# Patient Record
Sex: FEMALE | Race: WHITE | NOT HISPANIC OR LATINO | Employment: OTHER | URBAN - METROPOLITAN AREA
[De-identification: names, ages, dates, MRNs, and addresses within clinical notes are randomized per-mention and may not be internally consistent; named-entity substitution may affect disease eponyms.]

---

## 2023-03-06 LAB
ALANINE AMINOTRANSFERASE (SGPT) (U/L) IN SER/PLAS: 15 U/L (ref 7–45)
ALBUMIN (G/DL) IN SER/PLAS: 4.2 G/DL (ref 3.4–5)
ALKALINE PHOSPHATASE (U/L) IN SER/PLAS: 56 U/L (ref 33–136)
ANION GAP IN SER/PLAS: 14 MMOL/L (ref 10–20)
ASPARTATE AMINOTRANSFERASE (SGOT) (U/L) IN SER/PLAS: 16 U/L (ref 9–39)
BASOPHILS (10*3/UL) IN BLOOD BY AUTOMATED COUNT: 0.06 X10E9/L (ref 0–0.1)
BASOPHILS/100 LEUKOCYTES IN BLOOD BY AUTOMATED COUNT: 1 % (ref 0–2)
BILIRUBIN DIRECT (MG/DL) IN SER/PLAS: 0.1 MG/DL (ref 0–0.3)
BILIRUBIN TOTAL (MG/DL) IN SER/PLAS: 0.6 MG/DL (ref 0–1.2)
CALCIUM (MG/DL) IN SER/PLAS: 9.4 MG/DL (ref 8.6–10.6)
CARBON DIOXIDE, TOTAL (MMOL/L) IN SER/PLAS: 29 MMOL/L (ref 21–32)
CHLORIDE (MMOL/L) IN SER/PLAS: 103 MMOL/L (ref 98–107)
CREATININE (MG/DL) IN SER/PLAS: 0.72 MG/DL (ref 0.5–1.05)
EOSINOPHILS (10*3/UL) IN BLOOD BY AUTOMATED COUNT: 0.15 X10E9/L (ref 0–0.7)
EOSINOPHILS/100 LEUKOCYTES IN BLOOD BY AUTOMATED COUNT: 2.4 % (ref 0–6)
ERYTHROCYTE DISTRIBUTION WIDTH (RATIO) BY AUTOMATED COUNT: 14.3 % (ref 11.5–14.5)
ERYTHROCYTE MEAN CORPUSCULAR HEMOGLOBIN CONCENTRATION (G/DL) BY AUTOMATED: 31.2 G/DL (ref 32–36)
ERYTHROCYTE MEAN CORPUSCULAR VOLUME (FL) BY AUTOMATED COUNT: 91 FL (ref 80–100)
ERYTHROCYTES (10*6/UL) IN BLOOD BY AUTOMATED COUNT: 4.03 X10E12/L (ref 4–5.2)
GFR FEMALE: >90 ML/MIN/1.73M2
GLUCOSE (MG/DL) IN SER/PLAS: 103 MG/DL (ref 74–99)
HEMATOCRIT (%) IN BLOOD BY AUTOMATED COUNT: 36.5 % (ref 36–46)
HEMOGLOBIN (G/DL) IN BLOOD: 11.4 G/DL (ref 12–16)
IMMATURE GRANULOCYTES/100 LEUKOCYTES IN BLOOD BY AUTOMATED COUNT: 0.3 % (ref 0–0.9)
INR IN PPP BY COAGULATION ASSAY: 1 (ref 0.9–1.1)
LEUKOCYTES (10*3/UL) IN BLOOD BY AUTOMATED COUNT: 6.2 X10E9/L (ref 4.4–11.3)
LYMPHOCYTES (10*3/UL) IN BLOOD BY AUTOMATED COUNT: 1.85 X10E9/L (ref 1.2–4.8)
LYMPHOCYTES/100 LEUKOCYTES IN BLOOD BY AUTOMATED COUNT: 29.8 % (ref 13–44)
MONOCYTES (10*3/UL) IN BLOOD BY AUTOMATED COUNT: 0.52 X10E9/L (ref 0.1–1)
MONOCYTES/100 LEUKOCYTES IN BLOOD BY AUTOMATED COUNT: 8.4 % (ref 2–10)
NEUTROPHILS (10*3/UL) IN BLOOD BY AUTOMATED COUNT: 3.61 X10E9/L (ref 1.2–7.7)
NEUTROPHILS/100 LEUKOCYTES IN BLOOD BY AUTOMATED COUNT: 58.1 % (ref 40–80)
NRBC (PER 100 WBCS) BY AUTOMATED COUNT: 0 /100 WBC (ref 0–0)
PLATELETS (10*3/UL) IN BLOOD AUTOMATED COUNT: 209 X10E9/L (ref 150–450)
POTASSIUM (MMOL/L) IN SER/PLAS: 3.9 MMOL/L (ref 3.5–5.3)
PROTEIN TOTAL: 6.5 G/DL (ref 6.4–8.2)
PROTHROMBIN TIME (PT) IN PPP BY COAGULATION ASSAY: 11.8 SEC (ref 9.8–13.4)
SODIUM (MMOL/L) IN SER/PLAS: 142 MMOL/L (ref 136–145)
UREA NITROGEN (MG/DL) IN SER/PLAS: 21 MG/DL (ref 6–23)

## 2023-03-09 DIAGNOSIS — I10 HYPERTENSION, UNSPECIFIED TYPE: ICD-10-CM

## 2023-03-09 RX ORDER — LOSARTAN POTASSIUM 50 MG/1
1 TABLET ORAL DAILY
COMMUNITY
Start: 2022-06-05 | End: 2023-03-09 | Stop reason: SDUPTHER

## 2023-03-09 RX ORDER — LOSARTAN POTASSIUM 50 MG/1
50 TABLET ORAL DAILY
Qty: 90 TABLET | Refills: 1 | Status: SHIPPED | OUTPATIENT
Start: 2023-03-09 | End: 2023-08-14 | Stop reason: SDUPTHER

## 2023-03-09 RX ORDER — FUROSEMIDE 20 MG/1
1 TABLET ORAL DAILY
COMMUNITY
Start: 2021-08-12 | End: 2023-03-09 | Stop reason: SDUPTHER

## 2023-03-09 RX ORDER — FUROSEMIDE 20 MG/1
20 TABLET ORAL DAILY
Qty: 90 TABLET | Refills: 1 | Status: SHIPPED | OUTPATIENT
Start: 2023-03-09 | End: 2023-06-06

## 2023-04-19 PROBLEM — R10.823 RIGHT LOWER QUADRANT ABDOMINAL TENDERNESS WITH REBOUND TENDERNESS: Status: ACTIVE | Noted: 2023-04-19

## 2023-04-19 PROBLEM — Z98.49 HISTORY OF YAG LASER CAPSULOTOMY OF LENS: Status: ACTIVE | Noted: 2023-04-19

## 2023-04-19 PROBLEM — M17.11 PRIMARY OSTEOARTHRITIS OF RIGHT KNEE: Status: ACTIVE | Noted: 2023-04-19

## 2023-04-19 PROBLEM — R63.0 DECREASED APPETITE: Status: ACTIVE | Noted: 2023-04-19

## 2023-04-19 PROBLEM — H52.00 HYPEROPIA: Status: ACTIVE | Noted: 2023-04-19

## 2023-04-19 PROBLEM — R25.1 TREMOR: Status: ACTIVE | Noted: 2023-04-19

## 2023-04-19 PROBLEM — K74.60 CIRRHOSIS, NONALCOHOLIC (MULTI): Status: ACTIVE | Noted: 2023-04-19

## 2023-04-19 PROBLEM — N18.30 STAGE 3 CHRONIC KIDNEY DISEASE (MULTI): Status: ACTIVE | Noted: 2023-04-19

## 2023-04-19 PROBLEM — K35.32 ACUTE PERFORATED APPENDICITIS: Status: RESOLVED | Noted: 2023-04-19 | Resolved: 2023-04-19

## 2023-04-19 PROBLEM — R35.0 URINARY FREQUENCY: Status: RESOLVED | Noted: 2023-04-19 | Resolved: 2023-04-19

## 2023-04-19 PROBLEM — E11.21 DIABETES MELLITUS WITH KIDNEY DISEASE (MULTI): Status: RESOLVED | Noted: 2023-04-19 | Resolved: 2023-04-19

## 2023-04-19 PROBLEM — E11.3293 MILD NONPROLIFERATIVE DIABETIC RETINOPATHY OF BOTH EYES WITHOUT MACULAR EDEMA (MULTI): Status: ACTIVE | Noted: 2023-04-19

## 2023-04-19 PROBLEM — Z96.1 PSEUDOPHAKIA OF BOTH EYES: Status: ACTIVE | Noted: 2023-04-19

## 2023-04-19 PROBLEM — D12.6 TUBULAR ADENOMA OF COLON: Status: ACTIVE | Noted: 2023-04-19

## 2023-04-19 PROBLEM — M25.561 RIGHT KNEE PAIN: Status: RESOLVED | Noted: 2023-04-19 | Resolved: 2023-04-19

## 2023-04-19 PROBLEM — R92.8 ABNORMAL MAMMOGRAM: Status: ACTIVE | Noted: 2023-04-19

## 2023-04-19 PROBLEM — H52.209 ASTIGMATISM: Status: ACTIVE | Noted: 2023-04-19

## 2023-04-19 PROBLEM — E11.9 DIABETES MELLITUS, TYPE 2 (MULTI): Status: ACTIVE | Noted: 2023-04-19

## 2023-04-19 PROBLEM — E78.5 HYPERLIPIDEMIA: Status: ACTIVE | Noted: 2023-04-19

## 2023-04-19 PROBLEM — H52.4 MYOPIA WITH PRESBYOPIA: Status: ACTIVE | Noted: 2023-04-19

## 2023-04-19 PROBLEM — I10 HYPERTENSION: Status: ACTIVE | Noted: 2023-04-19

## 2023-04-19 PROBLEM — N30.01 ACUTE CYSTITIS WITH HEMATURIA: Status: RESOLVED | Noted: 2023-04-19 | Resolved: 2023-04-19

## 2023-04-19 PROBLEM — U07.1 COVID-19 VIRUS INFECTION: Status: RESOLVED | Noted: 2023-04-19 | Resolved: 2023-04-19

## 2023-04-19 PROBLEM — R11.0 NAUSEA IN ADULT: Status: ACTIVE | Noted: 2023-04-19

## 2023-04-19 PROBLEM — E03.9 HYPOTHYROIDISM: Status: ACTIVE | Noted: 2023-04-19

## 2023-04-19 PROBLEM — H52.10 MYOPIA WITH PRESBYOPIA: Status: ACTIVE | Noted: 2023-04-19

## 2023-04-19 PROBLEM — M25.661 STIFFNESS OF RIGHT KNEE, NOT ELSEWHERE CLASSIFIED: Status: ACTIVE | Noted: 2023-04-19

## 2023-04-19 RX ORDER — INSULIN ASPART 100 [IU]/ML
INJECTION, SOLUTION INTRAVENOUS; SUBCUTANEOUS
COMMUNITY
Start: 2021-09-03 | End: 2024-02-02 | Stop reason: SDUPTHER

## 2023-04-19 RX ORDER — FLASH GLUCOSE SENSOR
KIT MISCELLANEOUS AS NEEDED
COMMUNITY
Start: 2023-02-24

## 2023-04-19 RX ORDER — SIMVASTATIN 10 MG/1
1 TABLET, FILM COATED ORAL DAILY
COMMUNITY
Start: 2021-07-27 | End: 2023-08-14 | Stop reason: SDUPTHER

## 2023-04-19 RX ORDER — BISOPROLOL FUMARATE AND HYDROCHLOROTHIAZIDE 10; 6.25 MG/1; MG/1
1 TABLET ORAL DAILY
COMMUNITY
End: 2023-06-29

## 2023-04-19 RX ORDER — METFORMIN HYDROCHLORIDE 1000 MG/1
1 TABLET ORAL 2 TIMES DAILY
COMMUNITY
Start: 2021-07-15 | End: 2023-08-14 | Stop reason: SDUPTHER

## 2023-04-19 RX ORDER — NAPROXEN SODIUM 220 MG/1
81 TABLET, FILM COATED ORAL DAILY
COMMUNITY
Start: 2022-10-27 | End: 2023-08-14

## 2023-04-19 RX ORDER — INSULIN GLARGINE 100 [IU]/ML
37 INJECTION, SOLUTION SUBCUTANEOUS DAILY
COMMUNITY
Start: 2022-01-06

## 2023-04-19 RX ORDER — PROPRANOLOL HYDROCHLORIDE 10 MG/1
20 TABLET ORAL 2 TIMES DAILY
COMMUNITY
End: 2023-08-14 | Stop reason: SDUPTHER

## 2023-04-19 RX ORDER — LEVOTHYROXINE SODIUM 112 UG/1
1 TABLET ORAL DAILY
COMMUNITY
Start: 2021-05-31 | End: 2023-08-14 | Stop reason: SDUPTHER

## 2023-04-19 RX ORDER — DULAGLUTIDE 1.5 MG/.5ML
1.5 INJECTION, SOLUTION SUBCUTANEOUS
COMMUNITY
Start: 2021-09-10 | End: 2023-11-22 | Stop reason: SDUPTHER

## 2023-05-08 ENCOUNTER — OFFICE VISIT (OUTPATIENT)
Dept: PRIMARY CARE | Facility: CLINIC | Age: 68
End: 2023-05-08
Payer: MEDICARE

## 2023-05-08 VITALS
BODY MASS INDEX: 31.41 KG/M2 | HEIGHT: 64 IN | DIASTOLIC BLOOD PRESSURE: 76 MMHG | HEART RATE: 81 BPM | WEIGHT: 184 LBS | SYSTOLIC BLOOD PRESSURE: 126 MMHG

## 2023-05-08 DIAGNOSIS — E06.3 HYPOTHYROIDISM DUE TO HASHIMOTO'S THYROIDITIS: ICD-10-CM

## 2023-05-08 DIAGNOSIS — K74.60 CIRRHOSIS, NONALCOHOLIC (MULTI): ICD-10-CM

## 2023-05-08 DIAGNOSIS — E11.3293 MILD NONPROLIFERATIVE DIABETIC RETINOPATHY OF BOTH EYES WITHOUT MACULAR EDEMA ASSOCIATED WITH TYPE 2 DIABETES MELLITUS (MULTI): ICD-10-CM

## 2023-05-08 DIAGNOSIS — E03.8 HYPOTHYROIDISM DUE TO HASHIMOTO'S THYROIDITIS: ICD-10-CM

## 2023-05-08 DIAGNOSIS — J01.00 ACUTE NON-RECURRENT MAXILLARY SINUSITIS: Primary | ICD-10-CM

## 2023-05-08 DIAGNOSIS — N18.31 TYPE 2 DIABETES MELLITUS WITH STAGE 3A CHRONIC KIDNEY DISEASE, WITH LONG-TERM CURRENT USE OF INSULIN (MULTI): ICD-10-CM

## 2023-05-08 DIAGNOSIS — I10 PRIMARY HYPERTENSION: ICD-10-CM

## 2023-05-08 DIAGNOSIS — E11.22 TYPE 2 DIABETES MELLITUS WITH STAGE 3A CHRONIC KIDNEY DISEASE, WITH LONG-TERM CURRENT USE OF INSULIN (MULTI): ICD-10-CM

## 2023-05-08 DIAGNOSIS — N18.31 STAGE 3A CHRONIC KIDNEY DISEASE (MULTI): ICD-10-CM

## 2023-05-08 DIAGNOSIS — Z79.4 TYPE 2 DIABETES MELLITUS WITH STAGE 3A CHRONIC KIDNEY DISEASE, WITH LONG-TERM CURRENT USE OF INSULIN (MULTI): ICD-10-CM

## 2023-05-08 PROCEDURE — 4010F ACE/ARB THERAPY RXD/TAKEN: CPT | Performed by: FAMILY MEDICINE

## 2023-05-08 PROCEDURE — 1036F TOBACCO NON-USER: CPT | Performed by: FAMILY MEDICINE

## 2023-05-08 PROCEDURE — 3044F HG A1C LEVEL LT 7.0%: CPT | Performed by: FAMILY MEDICINE

## 2023-05-08 PROCEDURE — 1159F MED LIST DOCD IN RCRD: CPT | Performed by: FAMILY MEDICINE

## 2023-05-08 PROCEDURE — 3078F DIAST BP <80 MM HG: CPT | Performed by: FAMILY MEDICINE

## 2023-05-08 PROCEDURE — 3074F SYST BP LT 130 MM HG: CPT | Performed by: FAMILY MEDICINE

## 2023-05-08 PROCEDURE — 99214 OFFICE O/P EST MOD 30 MIN: CPT | Performed by: FAMILY MEDICINE

## 2023-05-08 RX ORDER — CEFUROXIME AXETIL 500 MG/1
500 TABLET ORAL 2 TIMES DAILY
Qty: 20 TABLET | Refills: 0 | Status: SHIPPED | OUTPATIENT
Start: 2023-05-08 | End: 2023-05-18

## 2023-05-08 ASSESSMENT — PATIENT HEALTH QUESTIONNAIRE - PHQ9
2. FEELING DOWN, DEPRESSED OR HOPELESS: NOT AT ALL
1. LITTLE INTEREST OR PLEASURE IN DOING THINGS: NOT AT ALL
SUM OF ALL RESPONSES TO PHQ9 QUESTIONS 1 AND 2: 0

## 2023-05-14 PROBLEM — J01.00 ACUTE NON-RECURRENT MAXILLARY SINUSITIS: Status: ACTIVE | Noted: 2023-05-14

## 2023-05-14 NOTE — PROGRESS NOTES
"Assessment and Plan:  Problem List Items Addressed This Visit       Cirrhosis, nonalcoholic (CMS/HCC)    Current Assessment & Plan     Control risk factors         Diabetes mellitus, type 2 (CMS/HCC)    Current Assessment & Plan     Continue follow-up with endocrinology         Hypertension    Current Assessment & Plan     Patient's blood pressure is at goal of 130/85 or less. Condition is stable. Continue current medications and treatment plan.  I recommend that you exercise for 30-45 minutes 5 days a week.  I also recommend a balanced diet with fruits and vegetables every day, lean meats, and little fried foods. The DASH diet (you can find this online) is a good example of this.          Hypothyroidism    Mild nonproliferative diabetic retinopathy of both eyes without macular edema (CMS/HCC)    Current Assessment & Plan     Continue follow-up with ophthalmology         Stage 3 chronic kidney disease (CMS/HCC)    Acute non-recurrent maxillary sinusitis - Primary    Relevant Medications    cefuroxime (Ceftin) 500 mg tablet     Follow-up 3 months    HPI: Patient is here for follow-up of diabetes hypertension hyperlipidemia diabetes traveling has been stable coughing congested        ROS   Constitutional:  o weight loss. Negative for chills and fever.     Cardiovascular: Negative.    Gastrointestinal: Negative.  Negative for nausea and vomiting.   Endocrine: Negative.    Genitourinary: Negative.    Musculoskeletal: Negative.    Skin: Negative.  Negative for rash.   Allergic/Immunologic: Negative.    Neurological: Negative.    Hematological: Negative.    Psychiatric/Behavioral: Negative.     All other systems reviewed and are negative.      Blood Pressure 126/76   Pulse 81   Height 1.626 m (5' 4\")   Weight 83.5 kg (184 lb)   Body Mass Index 31.58 kg/m²   Body mass index is 31.58 kg/m².  Physical Exam    ENT:      Head: Normocephalic and atraumatic.      Right Ear: Tympanic membrane normal.      Left Ear: Tympanic " membrane normal.      Nose: Nose normal.      Mouth/Throat:      Mouth: Mucous membranes are moist.   Eyes:      Pupils: Pupils are equal, round, and reactive to light.   Cardiovascular:      Rate and Rhythm: Normal rate and regular rhythm.      Pulses: Normal pulses.      Heart sounds: Normal heart sounds.   Pulmonary:      Effort: Pulmonary effort is normal.      Breath sounds: Normal breath sounds.   Abdominal:      General: Abdomen is flat. Bowel sounds are normal.      Palpations: Abdomen is soft.   Musculoskeletal:         General: Normal range of motion.      Cervical back: Normal range of motion and neck supple.   Skin:     General: Skin is warm and dry.      Capillary Refill: Capillary refill takes less than 2 seconds.   Neurological:      General: No focal deficit present.      Mental Status: She is alert and oriented to person, place, and time.   Psychiatric:         Mood and Affect: Mood normal.       Active Problem List  Patient Active Problem List   Diagnosis    Abnormal mammogram    Astigmatism    Hyperopia    Myopia with presbyopia    Cirrhosis, nonalcoholic (CMS/HCC)    Decreased appetite    Diabetes mellitus, type 2 (CMS/HCC)    History of YAG laser capsulotomy of lens    Hyperlipidemia    Hypertension    Hypothyroidism    Mild nonproliferative diabetic retinopathy of both eyes without macular edema (CMS/HCC)    Primary osteoarthritis of right knee    Nausea in adult    Stage 3 chronic kidney disease (CMS/HCC)    Pseudophakia of both eyes    Right lower quadrant abdominal tenderness with rebound tenderness    Tremor    Tubular adenoma of colon    Stiffness of right knee, not elsewhere classified    Acute non-recurrent maxillary sinusitis       Comprehensive Medical/Surgical/Social/Family History  Past Medical History:   Diagnosis Date    Acquired absence of both cervix and uterus     H/O: hysterectomy    Acute cystitis with hematuria 04/19/2023    COVID-19 virus infection 04/19/2023    Diabetes  mellitus with kidney disease (CMS/HCC) 04/19/2023    Personal history of other diseases of the digestive system     History of appendicitis    Personal history of other endocrine, nutritional and metabolic disease     History of diabetic retinopathy    Postprocedural hypothyroidism     H/O thyroidectomy    Right lower quadrant pain 07/08/2021    Acute right lower quadrant pain    Type 2 diabetes mellitus without complications (CMS/[facility])     Diabetes mellitus, stable     Past Surgical History:   Procedure Laterality Date    OTHER SURGICAL HISTORY  07/16/2021    Hysterectomy    OTHER SURGICAL HISTORY  07/16/2021    Appendectomy    OTHER SURGICAL HISTORY  07/16/2021    Thyroidectomy    OTHER SURGICAL HISTORY  04/25/2022    YAG laser capsulotomy    OTHER SURGICAL HISTORY  04/25/2022    Cataract surgery    OTHER SURGICAL HISTORY  01/06/2022    Knee replacement    OTHER SURGICAL HISTORY  01/06/2022    Tonsillectomy     Social History     Social History Narrative    Not on file       Allergies and Medications  Penicillins and Sulfamethoxazole  Current Outpatient Medications   Medication Instructions    aspirin 81 mg, oral, Daily    bisoproloL-hydrochlorothiazide (Ziac) 10-6.25 mg tablet 1 tablet, oral, Daily    cefuroxime (CEFTIN) 500 mg, oral, 2 times daily    FreeStyle Lucero 2 Sensor kit subcutaneous, As needed    furosemide (LASIX) 20 mg, oral, Daily    insulin aspart (NovoLOG) 100 unit/mL (3 mL) pen subcutaneous    Lantus Solostar U-100 Insulin 100 unit/mL (3 mL) pen INJECT 66 UNIT Daily    levothyroxine (Synthroid, Levoxyl) 112 mcg tablet 1 tablet, oral, Daily    losartan (COZAAR) 50 mg, oral, Daily    metFORMIN (Glucophage) 1,000 mg tablet 1 tablet, oral, 2 times daily    propranolol (INDERAL) 20 mg, oral, 2 times daily    simvastatin (Zocor) 10 mg tablet 1 tablet, oral, Daily    Trulicity 1.5 mg, subcutaneous, Weekly

## 2023-05-14 NOTE — ASSESSMENT & PLAN NOTE
Patient's blood pressure is at goal of 130/85 or less. Condition is stable. Continue current medications and treatment plan.  I recommend that you exercise for 30-45 minutes 5 days a week.  I also recommend a balanced diet with fruits and vegetables every day, lean meats, and little fried foods. The DASH diet (you can find this online) is a good example of this.

## 2023-06-01 LAB
ALANINE AMINOTRANSFERASE (SGPT) (U/L) IN SER/PLAS: 16 U/L (ref 7–45)
ALBUMIN (G/DL) IN SER/PLAS: 4.3 G/DL (ref 3.4–5)
ALKALINE PHOSPHATASE (U/L) IN SER/PLAS: 51 U/L (ref 33–136)
ALPHA-1 FETOPROTEIN (NG/ML) IN SER/PLAS: <4 NG/ML (ref 0–9)
ANION GAP IN SER/PLAS: 14 MMOL/L (ref 10–20)
ASPARTATE AMINOTRANSFERASE (SGOT) (U/L) IN SER/PLAS: 18 U/L (ref 9–39)
BASOPHILS (10*3/UL) IN BLOOD BY AUTOMATED COUNT: 0.07 X10E9/L (ref 0–0.1)
BASOPHILS/100 LEUKOCYTES IN BLOOD BY AUTOMATED COUNT: 0.9 % (ref 0–2)
BILIRUBIN DIRECT (MG/DL) IN SER/PLAS: 0.1 MG/DL (ref 0–0.3)
BILIRUBIN TOTAL (MG/DL) IN SER/PLAS: 0.6 MG/DL (ref 0–1.2)
CALCIUM (MG/DL) IN SER/PLAS: 10 MG/DL (ref 8.6–10.6)
CARBON DIOXIDE, TOTAL (MMOL/L) IN SER/PLAS: 30 MMOL/L (ref 21–32)
CHLORIDE (MMOL/L) IN SER/PLAS: 101 MMOL/L (ref 98–107)
CREATININE (MG/DL) IN SER/PLAS: 0.89 MG/DL (ref 0.5–1.05)
EOSINOPHILS (10*3/UL) IN BLOOD BY AUTOMATED COUNT: 0.24 X10E9/L (ref 0–0.7)
EOSINOPHILS/100 LEUKOCYTES IN BLOOD BY AUTOMATED COUNT: 3.3 % (ref 0–6)
ERYTHROCYTE DISTRIBUTION WIDTH (RATIO) BY AUTOMATED COUNT: 14 % (ref 11.5–14.5)
ERYTHROCYTE MEAN CORPUSCULAR HEMOGLOBIN CONCENTRATION (G/DL) BY AUTOMATED: 32.4 G/DL (ref 32–36)
ERYTHROCYTE MEAN CORPUSCULAR VOLUME (FL) BY AUTOMATED COUNT: 91 FL (ref 80–100)
ERYTHROCYTES (10*6/UL) IN BLOOD BY AUTOMATED COUNT: 3.96 X10E12/L (ref 4–5.2)
ESTIMATED AVERAGE GLUCOSE FOR HBA1C: 140 MG/DL
GFR FEMALE: 71 ML/MIN/1.73M2
GLUCOSE (MG/DL) IN SER/PLAS: 109 MG/DL (ref 74–99)
HEMATOCRIT (%) IN BLOOD BY AUTOMATED COUNT: 36.1 % (ref 36–46)
HEMOGLOBIN (G/DL) IN BLOOD: 11.7 G/DL (ref 12–16)
HEMOGLOBIN A1C/HEMOGLOBIN TOTAL IN BLOOD: 6.5 %
IMMATURE GRANULOCYTES/100 LEUKOCYTES IN BLOOD BY AUTOMATED COUNT: 0.3 % (ref 0–0.9)
INR IN PPP BY COAGULATION ASSAY: 1 (ref 0.9–1.1)
LEUKOCYTES (10*3/UL) IN BLOOD BY AUTOMATED COUNT: 7.4 X10E9/L (ref 4.4–11.3)
LYMPHOCYTES (10*3/UL) IN BLOOD BY AUTOMATED COUNT: 1.71 X10E9/L (ref 1.2–4.8)
LYMPHOCYTES/100 LEUKOCYTES IN BLOOD BY AUTOMATED COUNT: 23.2 % (ref 13–44)
MONOCYTES (10*3/UL) IN BLOOD BY AUTOMATED COUNT: 0.54 X10E9/L (ref 0.1–1)
MONOCYTES/100 LEUKOCYTES IN BLOOD BY AUTOMATED COUNT: 7.3 % (ref 2–10)
NEUTROPHILS (10*3/UL) IN BLOOD BY AUTOMATED COUNT: 4.8 X10E9/L (ref 1.2–7.7)
NEUTROPHILS/100 LEUKOCYTES IN BLOOD BY AUTOMATED COUNT: 65 % (ref 40–80)
NRBC (PER 100 WBCS) BY AUTOMATED COUNT: 0 /100 WBC (ref 0–0)
PLATELETS (10*3/UL) IN BLOOD AUTOMATED COUNT: 208 X10E9/L (ref 150–450)
POTASSIUM (MMOL/L) IN SER/PLAS: 4.2 MMOL/L (ref 3.5–5.3)
PROTEIN TOTAL: 6.9 G/DL (ref 6.4–8.2)
PROTHROMBIN TIME (PT) IN PPP BY COAGULATION ASSAY: 11.4 SEC (ref 9.8–13.4)
SODIUM (MMOL/L) IN SER/PLAS: 141 MMOL/L (ref 136–145)
UREA NITROGEN (MG/DL) IN SER/PLAS: 21 MG/DL (ref 6–23)

## 2023-06-06 DIAGNOSIS — I10 HYPERTENSION, UNSPECIFIED TYPE: ICD-10-CM

## 2023-06-06 RX ORDER — FUROSEMIDE 20 MG/1
20 TABLET ORAL DAILY
Qty: 30 TABLET | Refills: 0 | Status: SHIPPED | OUTPATIENT
Start: 2023-06-06 | End: 2023-07-20 | Stop reason: SDUPTHER

## 2023-06-28 DIAGNOSIS — E11.9 TYPE 2 DIABETES MELLITUS WITHOUT COMPLICATIONS (MULTI): ICD-10-CM

## 2023-06-29 RX ORDER — BISOPROLOL FUMARATE AND HYDROCHLOROTHIAZIDE 10; 6.25 MG/1; MG/1
1 TABLET ORAL DAILY
Qty: 90 TABLET | Refills: 1 | Status: SHIPPED | OUTPATIENT
Start: 2023-06-29 | End: 2023-08-14 | Stop reason: SDUPTHER

## 2023-07-20 DIAGNOSIS — I10 HYPERTENSION, UNSPECIFIED TYPE: ICD-10-CM

## 2023-07-20 RX ORDER — FUROSEMIDE 20 MG/1
20 TABLET ORAL DAILY
Qty: 90 TABLET | Refills: 1 | Status: SHIPPED | OUTPATIENT
Start: 2023-07-20 | End: 2024-01-01 | Stop reason: SDUPTHER

## 2023-08-13 PROBLEM — R92.1 CALCIFICATION OF LEFT BREAST: Status: ACTIVE | Noted: 2023-08-13

## 2023-08-14 ENCOUNTER — OFFICE VISIT (OUTPATIENT)
Dept: PRIMARY CARE | Facility: CLINIC | Age: 68
End: 2023-08-14
Payer: MEDICARE

## 2023-08-14 VITALS
HEART RATE: 76 BPM | WEIGHT: 182 LBS | BODY MASS INDEX: 31.07 KG/M2 | SYSTOLIC BLOOD PRESSURE: 123 MMHG | DIASTOLIC BLOOD PRESSURE: 80 MMHG | HEIGHT: 64 IN

## 2023-08-14 DIAGNOSIS — I10 HYPERTENSION, UNSPECIFIED TYPE: ICD-10-CM

## 2023-08-14 DIAGNOSIS — Z12.31 VISIT FOR SCREENING MAMMOGRAM: ICD-10-CM

## 2023-08-14 DIAGNOSIS — E03.8 HYPOTHYROIDISM DUE TO HASHIMOTO'S THYROIDITIS: ICD-10-CM

## 2023-08-14 DIAGNOSIS — E78.5 HYPERLIPIDEMIA, UNSPECIFIED HYPERLIPIDEMIA TYPE: ICD-10-CM

## 2023-08-14 DIAGNOSIS — E11.9 TYPE 2 DIABETES MELLITUS WITHOUT COMPLICATIONS (MULTI): ICD-10-CM

## 2023-08-14 DIAGNOSIS — E06.3 HYPOTHYROIDISM DUE TO HASHIMOTO'S THYROIDITIS: ICD-10-CM

## 2023-08-14 DIAGNOSIS — Z00.00 ROUTINE GENERAL MEDICAL EXAMINATION AT HEALTH CARE FACILITY: Primary | ICD-10-CM

## 2023-08-14 PROCEDURE — G0439 PPPS, SUBSEQ VISIT: HCPCS | Performed by: FAMILY MEDICINE

## 2023-08-14 PROCEDURE — 3074F SYST BP LT 130 MM HG: CPT | Performed by: FAMILY MEDICINE

## 2023-08-14 PROCEDURE — 1036F TOBACCO NON-USER: CPT | Performed by: FAMILY MEDICINE

## 2023-08-14 PROCEDURE — 1159F MED LIST DOCD IN RCRD: CPT | Performed by: FAMILY MEDICINE

## 2023-08-14 PROCEDURE — 99214 OFFICE O/P EST MOD 30 MIN: CPT | Performed by: FAMILY MEDICINE

## 2023-08-14 PROCEDURE — 3079F DIAST BP 80-89 MM HG: CPT | Performed by: FAMILY MEDICINE

## 2023-08-14 PROCEDURE — 3044F HG A1C LEVEL LT 7.0%: CPT | Performed by: FAMILY MEDICINE

## 2023-08-14 PROCEDURE — 4010F ACE/ARB THERAPY RXD/TAKEN: CPT | Performed by: FAMILY MEDICINE

## 2023-08-14 PROCEDURE — 1160F RVW MEDS BY RX/DR IN RCRD: CPT | Performed by: FAMILY MEDICINE

## 2023-08-14 PROCEDURE — 1170F FXNL STATUS ASSESSED: CPT | Performed by: FAMILY MEDICINE

## 2023-08-14 PROCEDURE — 1126F AMNT PAIN NOTED NONE PRSNT: CPT | Performed by: FAMILY MEDICINE

## 2023-08-14 RX ORDER — PROPRANOLOL HYDROCHLORIDE 10 MG/1
20 TABLET ORAL 2 TIMES DAILY
Qty: 180 TABLET | Refills: 1 | Status: SHIPPED | OUTPATIENT
Start: 2023-08-14 | End: 2024-02-19 | Stop reason: SDUPTHER

## 2023-08-14 RX ORDER — LEVOTHYROXINE SODIUM 112 UG/1
112 TABLET ORAL DAILY
Qty: 90 TABLET | Refills: 1 | Status: SHIPPED | OUTPATIENT
Start: 2023-08-14 | End: 2024-05-20 | Stop reason: SDUPTHER

## 2023-08-14 RX ORDER — LOSARTAN POTASSIUM 50 MG/1
50 TABLET ORAL DAILY
Qty: 90 TABLET | Refills: 1 | Status: SHIPPED | OUTPATIENT
Start: 2023-08-14 | End: 2024-02-19 | Stop reason: SDUPTHER

## 2023-08-14 RX ORDER — BISOPROLOL FUMARATE AND HYDROCHLOROTHIAZIDE 10; 6.25 MG/1; MG/1
1 TABLET ORAL DAILY
Qty: 90 TABLET | Refills: 1 | Status: SHIPPED | OUTPATIENT
Start: 2023-08-14 | End: 2024-02-19 | Stop reason: SDUPTHER

## 2023-08-14 RX ORDER — METFORMIN HYDROCHLORIDE 1000 MG/1
1000 TABLET ORAL 2 TIMES DAILY
Qty: 180 TABLET | Refills: 1 | Status: SHIPPED | OUTPATIENT
Start: 2023-08-14 | End: 2024-02-02 | Stop reason: SDUPTHER

## 2023-08-14 RX ORDER — SIMVASTATIN 10 MG/1
10 TABLET, FILM COATED ORAL DAILY
Qty: 90 TABLET | Refills: 1 | Status: SHIPPED | OUTPATIENT
Start: 2023-08-14 | End: 2024-02-19 | Stop reason: SDUPTHER

## 2023-08-14 ASSESSMENT — ACTIVITIES OF DAILY LIVING (ADL)
BATHING: INDEPENDENT
DOING_HOUSEWORK: INDEPENDENT
MANAGING_FINANCES: INDEPENDENT
DRESSING: INDEPENDENT
GROCERY_SHOPPING: INDEPENDENT
TAKING_MEDICATION: INDEPENDENT

## 2023-08-14 ASSESSMENT — PATIENT HEALTH QUESTIONNAIRE - PHQ9
SUM OF ALL RESPONSES TO PHQ9 QUESTIONS 1 AND 2: 0
2. FEELING DOWN, DEPRESSED OR HOPELESS: NOT AT ALL
1. LITTLE INTEREST OR PLEASURE IN DOING THINGS: NOT AT ALL

## 2023-08-14 ASSESSMENT — ENCOUNTER SYMPTOMS
OCCASIONAL FEELINGS OF UNSTEADINESS: 0
LOSS OF SENSATION IN FEET: 0
DEPRESSION: 0

## 2023-08-19 PROBLEM — Z00.00 ROUTINE GENERAL MEDICAL EXAMINATION AT HEALTH CARE FACILITY: Status: ACTIVE | Noted: 2023-08-19

## 2023-08-19 PROBLEM — E11.9 TYPE 2 DIABETES MELLITUS WITHOUT COMPLICATIONS (MULTI): Status: ACTIVE | Noted: 2023-08-19

## 2023-08-19 PROBLEM — Z12.31 VISIT FOR SCREENING MAMMOGRAM: Status: ACTIVE | Noted: 2023-08-19

## 2023-08-19 ASSESSMENT — ENCOUNTER SYMPTOMS
NAUSEA: 0
ENDOCRINE NEGATIVE: 1
MUSCULOSKELETAL NEGATIVE: 1
ALLERGIC/IMMUNOLOGIC NEGATIVE: 1
PSYCHIATRIC NEGATIVE: 1
HEMATOLOGIC/LYMPHATIC NEGATIVE: 1
CHILLS: 0
GASTROINTESTINAL NEGATIVE: 1
VOMITING: 0
NEUROLOGICAL NEGATIVE: 1
RESPIRATORY NEGATIVE: 1
FEVER: 0
CARDIOVASCULAR NEGATIVE: 1

## 2023-08-19 NOTE — PROGRESS NOTES
"Subjective   Reason for Visit: Barbara A Haase is an 68 y.o. female here for a Medicare Wellness visit.     Past Medical, Surgical, and Family History reviewed and updated in chart.    Reviewed all medications by prescribing practitioner or clinical pharmacist (such as prescriptions, OTCs, herbal therapies and supplements) and documented in the medical record.    HPI  Patient is here for follow-up and for Medicare wellness blood pressure controlled  Seeing endocrinology A1c slightly higher  Tolerating statin  Patient Care Team:  Jese Waite MD as PCP - General     Review of Systems   Constitutional:  Negative for chills and fever.   HENT: Negative.     Respiratory: Negative.     Cardiovascular: Negative.    Gastrointestinal: Negative.  Negative for nausea and vomiting.   Endocrine: Negative.    Genitourinary: Negative.    Musculoskeletal: Negative.    Skin: Negative.  Negative for rash.   Allergic/Immunologic: Negative.    Neurological: Negative.    Hematological: Negative.    Psychiatric/Behavioral: Negative.     All other systems reviewed and are negative.      Objective   Vitals:  Blood Pressure 123/80   Pulse 76   Height 1.626 m (5' 4\")   Weight 82.6 kg (182 lb)   Body Mass Index 31.24 kg/m²       Physical Exam  Vitals and nursing note reviewed.   Constitutional:       Appearance: Normal appearance.   HENT:      Head: Normocephalic and atraumatic.      Right Ear: Tympanic membrane normal.      Left Ear: Tympanic membrane normal.      Nose: Nose normal.      Mouth/Throat:      Mouth: Mucous membranes are moist.   Eyes:      Pupils: Pupils are equal, round, and reactive to light.   Cardiovascular:      Rate and Rhythm: Normal rate and regular rhythm.      Pulses: Normal pulses.      Heart sounds: Normal heart sounds.   Pulmonary:      Effort: Pulmonary effort is normal.      Breath sounds: Normal breath sounds.   Abdominal:      General: Abdomen is flat. Bowel sounds are normal.      Palpations: Abdomen is " soft.   Musculoskeletal:         General: Normal range of motion.      Cervical back: Normal range of motion and neck supple.   Skin:     General: Skin is warm and dry.      Capillary Refill: Capillary refill takes less than 2 seconds.   Neurological:      General: No focal deficit present.      Mental Status: She is alert and oriented to person, place, and time.   Psychiatric:         Mood and Affect: Mood normal.         Assessment/Plan   Problem List Items Addressed This Visit       Hyperlipidemia    Relevant Medications    simvastatin (Zocor) 10 mg tablet    Hypertension    Relevant Medications    bisoproloL-hydrochlorothiazide (Ziac) 10-6.25 mg tablet    losartan (Cozaar) 50 mg tablet    propranolol (Inderal) 10 mg tablet    Hypothyroidism    Relevant Medications    levothyroxine (Synthroid, Levoxyl) 112 mcg tablet    Routine general medical examination at health care facility - Primary    Type 2 diabetes mellitus without complications (CMS/HCC)    Relevant Medications    metFORMIN (Glucophage) 1,000 mg tablet    Visit for screening mammogram    Relevant Orders    BI mammo bilateral screening tomosynthesis   Follow up in 6 months

## 2023-09-22 LAB
ALANINE AMINOTRANSFERASE (SGPT) (U/L) IN SER/PLAS: 23 U/L (ref 7–45)
ALBUMIN (G/DL) IN SER/PLAS: 4.5 G/DL (ref 3.4–5)
ALKALINE PHOSPHATASE (U/L) IN SER/PLAS: 53 U/L (ref 33–136)
ANION GAP IN SER/PLAS: 15 MMOL/L (ref 10–20)
ASPARTATE AMINOTRANSFERASE (SGOT) (U/L) IN SER/PLAS: 23 U/L (ref 9–39)
BILIRUBIN TOTAL (MG/DL) IN SER/PLAS: 0.6 MG/DL (ref 0–1.2)
CALCIUM (MG/DL) IN SER/PLAS: 10.5 MG/DL (ref 8.6–10.6)
CARBON DIOXIDE, TOTAL (MMOL/L) IN SER/PLAS: 32 MMOL/L (ref 21–32)
CHLORIDE (MMOL/L) IN SER/PLAS: 98 MMOL/L (ref 98–107)
CREATININE (MG/DL) IN SER/PLAS: 1.11 MG/DL (ref 0.5–1.05)
ESTIMATED AVERAGE GLUCOSE FOR HBA1C: 120 MG/DL
GFR FEMALE: 54 ML/MIN/1.73M2
GLUCOSE (MG/DL) IN SER/PLAS: 110 MG/DL (ref 74–99)
HEMOGLOBIN A1C/HEMOGLOBIN TOTAL IN BLOOD: 5.8 %
POTASSIUM (MMOL/L) IN SER/PLAS: 4.1 MMOL/L (ref 3.5–5.3)
PROTEIN TOTAL: 7.1 G/DL (ref 6.4–8.2)
SODIUM (MMOL/L) IN SER/PLAS: 141 MMOL/L (ref 136–145)
UREA NITROGEN (MG/DL) IN SER/PLAS: 28 MG/DL (ref 6–23)

## 2023-10-17 ENCOUNTER — APPOINTMENT (OUTPATIENT)
Dept: RADIOLOGY | Facility: CLINIC | Age: 68
End: 2023-10-17
Payer: MEDICARE

## 2023-10-17 ENCOUNTER — OFFICE VISIT (OUTPATIENT)
Dept: ORTHOPEDIC SURGERY | Facility: CLINIC | Age: 68
End: 2023-10-17
Payer: MEDICARE

## 2023-10-17 ENCOUNTER — ANCILLARY PROCEDURE (OUTPATIENT)
Dept: RADIOLOGY | Facility: CLINIC | Age: 68
End: 2023-10-17
Payer: MEDICARE

## 2023-10-17 DIAGNOSIS — R22.32 MASS OF LEFT HAND: ICD-10-CM

## 2023-10-17 DIAGNOSIS — M67.449 MUCOUS CYST OF DIGIT OF HAND: Primary | ICD-10-CM

## 2023-10-17 PROCEDURE — 4010F ACE/ARB THERAPY RXD/TAKEN: CPT | Performed by: ORTHOPAEDIC SURGERY

## 2023-10-17 PROCEDURE — 99213 OFFICE O/P EST LOW 20 MIN: CPT | Performed by: ORTHOPAEDIC SURGERY

## 2023-10-17 PROCEDURE — 3044F HG A1C LEVEL LT 7.0%: CPT | Performed by: ORTHOPAEDIC SURGERY

## 2023-10-17 PROCEDURE — 1126F AMNT PAIN NOTED NONE PRSNT: CPT | Performed by: ORTHOPAEDIC SURGERY

## 2023-10-17 PROCEDURE — 99203 OFFICE O/P NEW LOW 30 MIN: CPT | Performed by: ORTHOPAEDIC SURGERY

## 2023-10-17 PROCEDURE — 73130 X-RAY EXAM OF HAND: CPT | Mod: LEFT SIDE | Performed by: STUDENT IN AN ORGANIZED HEALTH CARE EDUCATION/TRAINING PROGRAM

## 2023-10-17 PROCEDURE — 1036F TOBACCO NON-USER: CPT | Performed by: ORTHOPAEDIC SURGERY

## 2023-10-17 PROCEDURE — 73130 X-RAY EXAM OF HAND: CPT | Mod: LT

## 2023-10-17 PROCEDURE — 1160F RVW MEDS BY RX/DR IN RCRD: CPT | Performed by: ORTHOPAEDIC SURGERY

## 2023-10-17 PROCEDURE — 1159F MED LIST DOCD IN RCRD: CPT | Performed by: ORTHOPAEDIC SURGERY

## 2023-10-17 ASSESSMENT — PAIN SCALES - GENERAL: PAINLEVEL_OUTOF10: 1

## 2023-10-17 ASSESSMENT — PAIN DESCRIPTION - DESCRIPTORS: DESCRIPTORS: ACHING;BURNING

## 2023-10-17 ASSESSMENT — PAIN - FUNCTIONAL ASSESSMENT: PAIN_FUNCTIONAL_ASSESSMENT: 0-10

## 2023-10-17 NOTE — PROGRESS NOTES
The patient is a retired 68-year-old retired right hand dominant female presenting today for evaluation of a cyst on the dorsal aspect of the left middle finger. Present for 2-3 years. Fluctuates in size and symptoms and at times it will become big. Recently saw a dermatologist and mentioned it, who referred her to hand surgery for further evaluation. The cyst ruptured 2-3 weeks ago and is not symptomatic at this time.     Please refer to New Patient Intake Form scanned into patient's electronic record for self-reported past medical history, past surgical history, medications, allergies, family history, social history and 10 point review of systems.      Examination:   Constitutional: Oriented to person, place, and time.   Appears well-developed and well-nourished.   Head: Normocephalic and atraumatic.   Eyes: Pupils are equal, round, and reactive to light.   Cardiovascular: Intact distal pulses.   Pulmonary/Chest/Breast: Effort normal. No respiratory distress.   Neurological: Alert and oriented to person, place, and time.   Skin: Skin is warm and dry.   Psychiatric: normal mood and affect. Behavior is normal.   Musculoskeletal: Diffused degenerative changes. Nodes. Multiple DIP joints. Ruptured mucus cyst on dorsal ulnar aspect of middle finger distal to DIP joint. Normal nail fold and nail plate appearance. Full digital ROM. No signs of infection.     Impression: Mucus cyst of left middle finger.     Plan: We will continue with observation. The cyst is not at a size to aspirate or incise. If it does recur to the point that it is symptomatic, I'm happy to see her for consideration of aspiration or cervical incision so she can follow up as needed.     Tad Tejada MD      [facility] [employer]   Department of Orthopaedic Surgery   Chief of Hand and Upper Extremity Surgery   [facility]     Scribe Attestation  By signing my name  below, I, [place] [employer]   attest that this documentation has been prepared under the direction and in the presence of Tad Tejada MD.

## 2023-10-19 ENCOUNTER — OFFICE VISIT (OUTPATIENT)
Dept: ORTHOPEDIC SURGERY | Facility: CLINIC | Age: 68
End: 2023-10-19
Payer: MEDICARE

## 2023-10-19 ENCOUNTER — ANCILLARY PROCEDURE (OUTPATIENT)
Dept: RADIOLOGY | Facility: CLINIC | Age: 68
End: 2023-10-19
Payer: MEDICARE

## 2023-10-19 VITALS — HEIGHT: 64 IN | WEIGHT: 181 LBS | BODY MASS INDEX: 30.9 KG/M2

## 2023-10-19 DIAGNOSIS — M25.661 STIFFNESS OF RIGHT KNEE, NOT ELSEWHERE CLASSIFIED: ICD-10-CM

## 2023-10-19 DIAGNOSIS — M17.11 PRIMARY OSTEOARTHRITIS OF RIGHT KNEE: Primary | ICD-10-CM

## 2023-10-19 PROCEDURE — 3044F HG A1C LEVEL LT 7.0%: CPT | Performed by: ORTHOPAEDIC SURGERY

## 2023-10-19 PROCEDURE — 99213 OFFICE O/P EST LOW 20 MIN: CPT | Performed by: ORTHOPAEDIC SURGERY

## 2023-10-19 PROCEDURE — 4010F ACE/ARB THERAPY RXD/TAKEN: CPT | Performed by: ORTHOPAEDIC SURGERY

## 2023-10-19 PROCEDURE — 1160F RVW MEDS BY RX/DR IN RCRD: CPT | Performed by: ORTHOPAEDIC SURGERY

## 2023-10-19 PROCEDURE — 1159F MED LIST DOCD IN RCRD: CPT | Performed by: ORTHOPAEDIC SURGERY

## 2023-10-19 PROCEDURE — 73560 X-RAY EXAM OF KNEE 1 OR 2: CPT | Mod: RT,FY

## 2023-10-19 PROCEDURE — 1126F AMNT PAIN NOTED NONE PRSNT: CPT | Performed by: ORTHOPAEDIC SURGERY

## 2023-10-19 PROCEDURE — 73560 X-RAY EXAM OF KNEE 1 OR 2: CPT | Mod: RIGHT SIDE | Performed by: RADIOLOGY

## 2023-10-19 PROCEDURE — 1036F TOBACCO NON-USER: CPT | Performed by: ORTHOPAEDIC SURGERY

## 2023-10-19 NOTE — PROGRESS NOTES
ORTHOPEDIC TOTAL JOINT  POST-OPERATIVE FOLLOW UP      ============================  IMPRESSION/PLAN:  ============================  68 y.o. female s/p Right Total Knee Replacement completed on 10/26/22    PLAN:  -Weightbearing: Weight bearing as tolerated  -DVT Prophylaxis: No longer needed  -Radiology Studies: X-rays reviewed with the patient in the office today  -Therapies: Continue regular exercise program with continued focus on quadricep strengthening  -Follow-up: As needed        Barbara A Haase presents today for follow up of joint replacement above. Pain controlled with current treatment plan.  Has minimal discomfort at this time, has some pain with prolonged standing.  They are currently ambulating with  no assisted devices .     Review of Systems:   Constitutional: See HPI for pain assessment, No significant weight loss, recent trauma. Denies fevers/chills  Cardiovascular: No chest pain, shortness of breath  Respiratory: No difficulty breathing, cough  Gastrointestinal: No nausea, vomiting, diarrhea, constipation  Musculoskeletal: Noted in HPI, no arthralgias   Integumentary: No rashes, easy bruising, redness   Neurological: no numbness or tingling in extremities, no gait disturbances     Patient Active Problem List   Diagnosis    Abnormal mammogram    Astigmatism    Hyperopia    Myopia with presbyopia    Cirrhosis, nonalcoholic (CMS/HCC)    Decreased appetite    Diabetes mellitus, type 2 (CMS/HCC)    History of YAG laser capsulotomy of lens    Hyperlipidemia    Hypertension    Hypothyroidism    Mild nonproliferative diabetic retinopathy of both eyes without macular edema (CMS/HCC)    Primary osteoarthritis of right knee    Nausea in adult    Stage 3 chronic kidney disease (CMS/HCC)    Pseudophakia of both eyes    Right lower quadrant abdominal tenderness with rebound tenderness    Tremor    Tubular adenoma of colon    Stiffness of right knee, not elsewhere classified    Acute non-recurrent maxillary  sinusitis    Calcification of left breast    Routine general medical examination at [facility]    Type 2 diabetes mellitus without complications (CMS/[facility])    Visit for screening mammogram       =================================  EXAM  =================================  GENERAL: A/Ox3, NAD. Appears healthy, well nourished  CARDIAC: regular rate  LUNGS: Breathing non-labored    MUSCULOSKELETAL:  Laterality: right Knee Exam  - Incision: Well-healed  - Palpation: NoTTP along surgical incision  - Effusion: None evident  - ROM: 0 to 120 degrees  - Stability: Anterior/Posterior stable, varus/valgus stable  - compartments soft, negative homans    NEUROVASCULAR:  - Neurovascular Status: sensation intact to light touch distally  - Capillary refill brisk at extremities, Bilateral dorsalis pedis pulse 2+     IMAGING: Multiple views of the right knee demonstrate no signs of loosening or failure of right total knee arthroplasty. X-rays were personally reviewed by me.  Radiology reports were reviewed by me as well, if available at the time.        Keysha Salazar DO  Attending Surgeon  Joint Replacement and Adult Reconstructive Surgery  [place], [place]

## 2023-11-06 ENCOUNTER — LAB (OUTPATIENT)
Dept: LAB | Facility: LAB | Age: 68
End: 2023-11-06
Payer: MEDICARE

## 2023-11-06 DIAGNOSIS — K74.60 UNSPECIFIED CIRRHOSIS OF LIVER (MULTI): Primary | ICD-10-CM

## 2023-11-06 DIAGNOSIS — K74.60 UNSPECIFIED CIRRHOSIS OF LIVER (MULTI): ICD-10-CM

## 2023-11-06 LAB
ALBUMIN SERPL BCP-MCNC: 4.2 G/DL (ref 3.4–5)
ALP SERPL-CCNC: 50 U/L (ref 33–136)
ALT SERPL W P-5'-P-CCNC: 16 U/L (ref 7–45)
ANION GAP SERPL CALC-SCNC: 16 MMOL/L (ref 10–20)
AST SERPL W P-5'-P-CCNC: 17 U/L (ref 9–39)
BASOPHILS # BLD AUTO: 0.06 X10*3/UL (ref 0–0.1)
BASOPHILS NFR BLD AUTO: 0.8 %
BILIRUB DIRECT SERPL-MCNC: 0.1 MG/DL (ref 0–0.3)
BILIRUB SERPL-MCNC: 0.6 MG/DL (ref 0–1.2)
BUN SERPL-MCNC: 28 MG/DL (ref 6–23)
CALCIUM SERPL-MCNC: 10.1 MG/DL (ref 8.6–10.6)
CHLORIDE SERPL-SCNC: 101 MMOL/L (ref 98–107)
CO2 SERPL-SCNC: 27 MMOL/L (ref 21–32)
CREAT SERPL-MCNC: 0.99 MG/DL (ref 0.5–1.05)
EOSINOPHIL # BLD AUTO: 0.17 X10*3/UL (ref 0–0.7)
EOSINOPHIL NFR BLD AUTO: 2.4 %
ERYTHROCYTE [DISTWIDTH] IN BLOOD BY AUTOMATED COUNT: 13.9 % (ref 11.5–14.5)
GFR SERPL CREATININE-BSD FRML MDRD: 62 ML/MIN/1.73M*2
GLUCOSE SERPL-MCNC: 131 MG/DL (ref 74–99)
HCT VFR BLD AUTO: 38.2 % (ref 36–46)
HGB BLD-MCNC: 12.3 G/DL (ref 12–16)
IMM GRANULOCYTES # BLD AUTO: 0.02 X10*3/UL (ref 0–0.7)
IMM GRANULOCYTES NFR BLD AUTO: 0.3 % (ref 0–0.9)
INR PPP: 1 (ref 0.9–1.1)
LYMPHOCYTES # BLD AUTO: 1.88 X10*3/UL (ref 1.2–4.8)
LYMPHOCYTES NFR BLD AUTO: 26 %
MCH RBC QN AUTO: 29.6 PG (ref 26–34)
MCHC RBC AUTO-ENTMCNC: 32.2 G/DL (ref 32–36)
MCV RBC AUTO: 92 FL (ref 80–100)
MONOCYTES # BLD AUTO: 0.45 X10*3/UL (ref 0.1–1)
MONOCYTES NFR BLD AUTO: 6.2 %
NEUTROPHILS # BLD AUTO: 4.65 X10*3/UL (ref 1.2–7.7)
NEUTROPHILS NFR BLD AUTO: 64.3 %
NRBC BLD-RTO: 0 /100 WBCS (ref 0–0)
PLATELET # BLD AUTO: 244 X10*3/UL (ref 150–450)
POTASSIUM SERPL-SCNC: 4.1 MMOL/L (ref 3.5–5.3)
PROT SERPL-MCNC: 6.9 G/DL (ref 6.4–8.2)
PROTHROMBIN TIME: 11.6 SECONDS (ref 9.8–12.8)
RBC # BLD AUTO: 4.16 X10*6/UL (ref 4–5.2)
SODIUM SERPL-SCNC: 140 MMOL/L (ref 136–145)
WBC # BLD AUTO: 7.2 X10*3/UL (ref 4.4–11.3)

## 2023-11-06 PROCEDURE — 80053 COMPREHEN METABOLIC PANEL: CPT

## 2023-11-06 PROCEDURE — 85610 PROTHROMBIN TIME: CPT

## 2023-11-06 PROCEDURE — 82248 BILIRUBIN DIRECT: CPT

## 2023-11-06 PROCEDURE — 85025 COMPLETE CBC W/AUTO DIFF WBC: CPT

## 2023-11-06 PROCEDURE — 36415 COLL VENOUS BLD VENIPUNCTURE: CPT

## 2023-11-09 ENCOUNTER — APPOINTMENT (OUTPATIENT)
Dept: RADIOLOGY | Facility: CLINIC | Age: 68
End: 2023-11-09
Payer: MEDICARE

## 2023-11-22 DIAGNOSIS — Z79.4 TYPE 2 DIABETES MELLITUS WITH STAGE 3A CHRONIC KIDNEY DISEASE, WITH LONG-TERM CURRENT USE OF INSULIN (MULTI): Primary | ICD-10-CM

## 2023-11-22 DIAGNOSIS — N18.31 TYPE 2 DIABETES MELLITUS WITH STAGE 3A CHRONIC KIDNEY DISEASE, WITH LONG-TERM CURRENT USE OF INSULIN (MULTI): Primary | ICD-10-CM

## 2023-11-22 DIAGNOSIS — E11.22 TYPE 2 DIABETES MELLITUS WITH STAGE 3A CHRONIC KIDNEY DISEASE, WITH LONG-TERM CURRENT USE OF INSULIN (MULTI): Primary | ICD-10-CM

## 2023-11-22 RX ORDER — DULAGLUTIDE 1.5 MG/.5ML
1.5 INJECTION, SOLUTION SUBCUTANEOUS
Qty: 8 ML | Refills: 0 | Status: SHIPPED
Start: 2023-11-22 | End: 2024-02-02 | Stop reason: SDUPTHER

## 2023-12-08 ENCOUNTER — APPOINTMENT (OUTPATIENT)
Dept: RADIOLOGY | Facility: CLINIC | Age: 68
End: 2023-12-08
Payer: MEDICARE

## 2024-01-01 DIAGNOSIS — I10 HYPERTENSION, UNSPECIFIED TYPE: ICD-10-CM

## 2024-01-02 ENCOUNTER — ANCILLARY PROCEDURE (OUTPATIENT)
Dept: RADIOLOGY | Facility: CLINIC | Age: 69
End: 2024-01-02
Payer: MEDICARE

## 2024-01-02 DIAGNOSIS — Z12.31 VISIT FOR SCREENING MAMMOGRAM: ICD-10-CM

## 2024-01-02 PROCEDURE — 77063 BREAST TOMOSYNTHESIS BI: CPT

## 2024-01-02 PROCEDURE — 77063 BREAST TOMOSYNTHESIS BI: CPT | Mod: BILATERAL PROCEDURE | Performed by: RADIOLOGY

## 2024-01-02 PROCEDURE — 77067 SCR MAMMO BI INCL CAD: CPT | Mod: BILATERAL PROCEDURE | Performed by: RADIOLOGY

## 2024-01-02 RX ORDER — FUROSEMIDE 20 MG/1
20 TABLET ORAL DAILY
Qty: 90 TABLET | Refills: 1 | Status: SHIPPED | OUTPATIENT
Start: 2024-01-02 | End: 2024-05-13

## 2024-02-02 ENCOUNTER — OFFICE VISIT (OUTPATIENT)
Dept: ENDOCRINOLOGY | Facility: CLINIC | Age: 69
End: 2024-02-02
Payer: MEDICARE

## 2024-02-02 VITALS
HEART RATE: 63 BPM | DIASTOLIC BLOOD PRESSURE: 80 MMHG | RESPIRATION RATE: 16 BRPM | HEIGHT: 64 IN | BODY MASS INDEX: 30.66 KG/M2 | SYSTOLIC BLOOD PRESSURE: 114 MMHG | WEIGHT: 179.6 LBS

## 2024-02-02 DIAGNOSIS — N18.31 TYPE 2 DIABETES MELLITUS WITH STAGE 3A CHRONIC KIDNEY DISEASE, WITH LONG-TERM CURRENT USE OF INSULIN (MULTI): Primary | ICD-10-CM

## 2024-02-02 DIAGNOSIS — E11.22 TYPE 2 DIABETES MELLITUS WITH STAGE 3A CHRONIC KIDNEY DISEASE, WITH LONG-TERM CURRENT USE OF INSULIN (MULTI): Primary | ICD-10-CM

## 2024-02-02 DIAGNOSIS — Z79.4 TYPE 2 DIABETES MELLITUS WITH STAGE 3A CHRONIC KIDNEY DISEASE, WITH LONG-TERM CURRENT USE OF INSULIN (MULTI): Primary | ICD-10-CM

## 2024-02-02 DIAGNOSIS — E78.5 HYPERLIPIDEMIA, UNSPECIFIED HYPERLIPIDEMIA TYPE: ICD-10-CM

## 2024-02-02 DIAGNOSIS — I10 PRIMARY HYPERTENSION: ICD-10-CM

## 2024-02-02 DIAGNOSIS — E11.9 TYPE 2 DIABETES MELLITUS WITHOUT COMPLICATIONS (MULTI): ICD-10-CM

## 2024-02-02 DIAGNOSIS — E06.3 HYPOTHYROIDISM DUE TO HASHIMOTO'S THYROIDITIS: ICD-10-CM

## 2024-02-02 DIAGNOSIS — E03.8 HYPOTHYROIDISM DUE TO HASHIMOTO'S THYROIDITIS: ICD-10-CM

## 2024-02-02 PROCEDURE — 99214 OFFICE O/P EST MOD 30 MIN: CPT | Performed by: INTERNAL MEDICINE

## 2024-02-02 PROCEDURE — 4010F ACE/ARB THERAPY RXD/TAKEN: CPT | Performed by: INTERNAL MEDICINE

## 2024-02-02 PROCEDURE — 1159F MED LIST DOCD IN RCRD: CPT | Performed by: INTERNAL MEDICINE

## 2024-02-02 PROCEDURE — 3079F DIAST BP 80-89 MM HG: CPT | Performed by: INTERNAL MEDICINE

## 2024-02-02 PROCEDURE — 1160F RVW MEDS BY RX/DR IN RCRD: CPT | Performed by: INTERNAL MEDICINE

## 2024-02-02 PROCEDURE — 1126F AMNT PAIN NOTED NONE PRSNT: CPT | Performed by: INTERNAL MEDICINE

## 2024-02-02 PROCEDURE — 1157F ADVNC CARE PLAN IN RCRD: CPT | Performed by: INTERNAL MEDICINE

## 2024-02-02 PROCEDURE — 1036F TOBACCO NON-USER: CPT | Performed by: INTERNAL MEDICINE

## 2024-02-02 PROCEDURE — 3074F SYST BP LT 130 MM HG: CPT | Performed by: INTERNAL MEDICINE

## 2024-02-02 RX ORDER — METFORMIN HYDROCHLORIDE 1000 MG/1
1000 TABLET ORAL 2 TIMES DAILY
Qty: 180 TABLET | Refills: 3 | Status: SHIPPED | OUTPATIENT
Start: 2024-02-02

## 2024-02-02 RX ORDER — DULAGLUTIDE 1.5 MG/.5ML
1.5 INJECTION, SOLUTION SUBCUTANEOUS
Qty: 12 EACH | Refills: 3 | Status: SHIPPED | OUTPATIENT
Start: 2024-02-02 | End: 2024-02-05 | Stop reason: SDUPTHER

## 2024-02-02 RX ORDER — PEN NEEDLE, DIABETIC 32GX 5/32"
NEEDLE, DISPOSABLE MISCELLANEOUS
COMMUNITY
Start: 2023-09-22

## 2024-02-02 RX ORDER — INSULIN ASPART 100 [IU]/ML
INJECTION, SOLUTION INTRAVENOUS; SUBCUTANEOUS
Qty: 40 ML | Refills: 3 | Status: SHIPPED | OUTPATIENT
Start: 2024-02-02

## 2024-02-02 ASSESSMENT — ENCOUNTER SYMPTOMS
VOMITING: 0
CHILLS: 0
DIARRHEA: 0
PALPITATIONS: 0
HEADACHES: 0
FATIGUE: 0
SHORTNESS OF BREATH: 0
FEVER: 0
NAUSEA: 0
COUGH: 0

## 2024-02-02 NOTE — PROGRESS NOTES
Endocrinology: Follow up visit  Subjective   Patient ID: Barbara A Haase is a 68 y.o. female who presents for Diabetes (Type 2 ), Hypothyroidism, Hyperlipidemia, and Hypertension.    PCP: Jese Waite MD    HPI  Since last visit continues to do great with sugars.  Last time reduced basal insulin and only a few lower overnight numbers.   Tired today as recently returned from [facility].   Taking t4 as directed status post total thyroidectomy for benign nodules     INJECTS INSULIN 4X A DAY  CHECKS SUGARS 4X A DAY  NEEDS FREQUENT GLUCOSE CHECKS TO ADJUST MEAL TIME INSULIN   CURRENTLY UTILIZING CGM    Review of Systems   Constitutional:  Negative for chills, fatigue and fever.   Respiratory:  Negative for cough and shortness of breath.    Cardiovascular:  Negative for chest pain and palpitations.   Gastrointestinal:  Negative for diarrhea, nausea and vomiting.   Neurological:  Negative for headaches.       Patient Active Problem List   Diagnosis    Abnormal mammogram    Astigmatism    Hyperopia    Myopia with presbyopia    Cirrhosis, nonalcoholic (CMS/HCC)    Decreased appetite    Diabetes mellitus, type 2 (CMS/HCC)    History of YAG laser capsulotomy of lens    Hyperlipidemia    Hypertension    Hypothyroidism    Mild nonproliferative diabetic retinopathy of both eyes without macular edema (CMS/HCC)    Primary osteoarthritis of right knee    Nausea in adult    Stage 3 chronic kidney disease (CMS/HCC)    Pseudophakia of both eyes    Right lower quadrant abdominal tenderness with rebound tenderness    Tremor    Tubular adenoma of colon    Stiffness of right knee, not elsewhere classified    Acute non-recurrent maxillary sinusitis    Calcification of left breast    Routine general medical examination at health care facility    Type 2 diabetes mellitus without complications (CMS/HCC)    Visit for screening mammogram        Home Meds:  Current Outpatient Medications   Medication Instructions    BD Sarah 2nd Gen Pen Needle 32  "gauge x 5/32\" needle USE WITH INSULIN 4 TIMES DAILY    bisoproloL-hydrochlorothiazide (Ziac) 10-6.25 mg tablet 1 tablet, oral, Daily    FreeStyle Lucero 2 Sensor kit subcutaneous, As needed    furosemide (LASIX) 20 mg, oral, Daily    insulin aspart (NovoLOG) 100 unit/mL (3 mL) pen subcutaneous    Lantus Solostar U-100 Insulin 100 unit/mL (3 mL) pen INJECT 66 UNIT Daily    levothyroxine (SYNTHROID, LEVOXYL) 112 mcg, oral, Daily    losartan (COZAAR) 50 mg, oral, Daily    metFORMIN (GLUCOPHAGE) 1,000 mg, oral, 2 times daily    propranolol (INDERAL) 20 mg, oral, 2 times daily    simvastatin (ZOCOR) 10 mg, oral, Daily    Trulicity 1.5 mg, subcutaneous, Weekly        Allergies   Allergen Reactions    Penicillins Unknown    Sulfamethoxazole Other        Objective   Vitals:    02/02/24 0940   BP: 114/80   Pulse: 63   Resp: 16      Vitals:    02/02/24 0940   Weight: 81.5 kg (179 lb 9.6 oz)      Body mass index is 30.83 kg/m².   Physical Exam  Constitutional:       Appearance: Normal appearance. She is overweight.   HENT:      Head: Normocephalic and atraumatic.   Neck:      Thyroid: No thyroid mass, thyromegaly or thyroid tenderness.   Cardiovascular:      Rate and Rhythm: Normal rate and regular rhythm.      Heart sounds: No murmur heard.     No gallop.   Pulmonary:      Effort: Pulmonary effort is normal.      Breath sounds: Normal breath sounds.   Abdominal:      Palpations: Abdomen is soft.      Comments: benign   Neurological:      General: No focal deficit present.      Mental Status: She is alert and oriented to person, place, and time.      Deep Tendon Reflexes: Reflexes are normal and symmetric.   Psychiatric:         Behavior: Behavior is cooperative.         Labs:  Lab Results   Component Value Date    HGBA1C 5.8 (A) 09/22/2023    TSH 1.77 01/24/2023      No results found for: \"PR1\", \"THYROIDPAB\", \"TSI\"     Assessment/Plan   Problem List Items Addressed This Visit       Diabetes mellitus, type 2 (CMS/HCC) - Primary "    Relevant Orders    Comprehensive Metabolic Panel    CBC    Lipid Panel    Hemoglobin A1C    Albumin , Urine Random    Hyperlipidemia    Hypertension    Hypothyroidism    Relevant Orders    TSH with reflex to Free T4 if abnormal   DM2:  Reduce basal to 40 units  Continue meal insulin up to  40 units total  Htn:  Bp excellent  Lipids:  Due for recheck  Hypothyroidism:  Due for yearly check  Follow up in 4 months      Electronically signed by:  Jhoana Malik MD 02/02/24 10:10 AM

## 2024-02-05 ENCOUNTER — HOSPITAL ENCOUNTER (OUTPATIENT)
Dept: RADIOLOGY | Facility: CLINIC | Age: 69
Discharge: HOME | End: 2024-02-05
Payer: MEDICARE

## 2024-02-05 ENCOUNTER — LAB (OUTPATIENT)
Dept: LAB | Facility: LAB | Age: 69
End: 2024-02-05
Payer: MEDICARE

## 2024-02-05 DIAGNOSIS — K74.60 UNSPECIFIED CIRRHOSIS OF LIVER (MULTI): ICD-10-CM

## 2024-02-05 DIAGNOSIS — N18.31 TYPE 2 DIABETES MELLITUS WITH STAGE 3A CHRONIC KIDNEY DISEASE, WITH LONG-TERM CURRENT USE OF INSULIN (MULTI): ICD-10-CM

## 2024-02-05 DIAGNOSIS — E11.22 TYPE 2 DIABETES MELLITUS WITH STAGE 3A CHRONIC KIDNEY DISEASE, WITH LONG-TERM CURRENT USE OF INSULIN (MULTI): ICD-10-CM

## 2024-02-05 DIAGNOSIS — E03.8 HYPOTHYROIDISM DUE TO HASHIMOTO'S THYROIDITIS: ICD-10-CM

## 2024-02-05 DIAGNOSIS — Z79.4 TYPE 2 DIABETES MELLITUS WITH STAGE 3A CHRONIC KIDNEY DISEASE, WITH LONG-TERM CURRENT USE OF INSULIN (MULTI): ICD-10-CM

## 2024-02-05 DIAGNOSIS — K74.60 UNSPECIFIED CIRRHOSIS OF LIVER (MULTI): Primary | ICD-10-CM

## 2024-02-05 DIAGNOSIS — E06.3 HYPOTHYROIDISM DUE TO HASHIMOTO'S THYROIDITIS: ICD-10-CM

## 2024-02-05 LAB
AFP SERPL-MCNC: <4 NG/ML (ref 0–9)
ALBUMIN SERPL BCP-MCNC: 4.5 G/DL (ref 3.4–5)
ALP SERPL-CCNC: 58 U/L (ref 33–136)
ALT SERPL W P-5'-P-CCNC: 18 U/L (ref 7–45)
ANION GAP SERPL CALC-SCNC: 15 MMOL/L (ref 10–20)
AST SERPL W P-5'-P-CCNC: 20 U/L (ref 9–39)
BASOPHILS # BLD AUTO: 0.07 X10*3/UL (ref 0–0.1)
BASOPHILS NFR BLD AUTO: 0.9 %
BILIRUB DIRECT SERPL-MCNC: 0.1 MG/DL (ref 0–0.3)
BILIRUB SERPL-MCNC: 0.6 MG/DL (ref 0–1.2)
BUN SERPL-MCNC: 32 MG/DL (ref 6–23)
CALCIUM SERPL-MCNC: 10.2 MG/DL (ref 8.6–10.6)
CHLORIDE SERPL-SCNC: 100 MMOL/L (ref 98–107)
CHOLEST SERPL-MCNC: 124 MG/DL (ref 0–199)
CHOLESTEROL/HDL RATIO: 2.8
CO2 SERPL-SCNC: 30 MMOL/L (ref 21–32)
CREAT SERPL-MCNC: 1.02 MG/DL (ref 0.5–1.05)
EGFRCR SERPLBLD CKD-EPI 2021: 60 ML/MIN/1.73M*2
EOSINOPHIL # BLD AUTO: 0.2 X10*3/UL (ref 0–0.7)
EOSINOPHIL NFR BLD AUTO: 2.5 %
ERYTHROCYTE [DISTWIDTH] IN BLOOD BY AUTOMATED COUNT: 13.5 % (ref 11.5–14.5)
EST. AVERAGE GLUCOSE BLD GHB EST-MCNC: 123 MG/DL
GLUCOSE SERPL-MCNC: 98 MG/DL (ref 74–99)
HBA1C MFR BLD: 5.9 %
HCT VFR BLD AUTO: 40.4 % (ref 36–46)
HDLC SERPL-MCNC: 43.9 MG/DL
HGB BLD-MCNC: 13 G/DL (ref 12–16)
IMM GRANULOCYTES # BLD AUTO: 0.02 X10*3/UL (ref 0–0.7)
IMM GRANULOCYTES NFR BLD AUTO: 0.3 % (ref 0–0.9)
INR PPP: 1 (ref 0.9–1.1)
LDLC SERPL CALC-MCNC: 50 MG/DL
LYMPHOCYTES # BLD AUTO: 2.19 X10*3/UL (ref 1.2–4.8)
LYMPHOCYTES NFR BLD AUTO: 27.5 %
MCH RBC QN AUTO: 29 PG (ref 26–34)
MCHC RBC AUTO-ENTMCNC: 32.2 G/DL (ref 32–36)
MCV RBC AUTO: 90 FL (ref 80–100)
MONOCYTES # BLD AUTO: 0.54 X10*3/UL (ref 0.1–1)
MONOCYTES NFR BLD AUTO: 6.8 %
NEUTROPHILS # BLD AUTO: 4.94 X10*3/UL (ref 1.2–7.7)
NEUTROPHILS NFR BLD AUTO: 62 %
NON HDL CHOLESTEROL: 80 MG/DL (ref 0–149)
NRBC BLD-RTO: 0 /100 WBCS (ref 0–0)
PLATELET # BLD AUTO: 239 X10*3/UL (ref 150–450)
POTASSIUM SERPL-SCNC: 4.6 MMOL/L (ref 3.5–5.3)
PROT SERPL-MCNC: 7.4 G/DL (ref 6.4–8.2)
PROTHROMBIN TIME: 11.5 SECONDS (ref 9.8–12.8)
RBC # BLD AUTO: 4.49 X10*6/UL (ref 4–5.2)
SODIUM SERPL-SCNC: 140 MMOL/L (ref 136–145)
TRIGL SERPL-MCNC: 151 MG/DL (ref 0–149)
TSH SERPL-ACNC: 2.5 MIU/L (ref 0.44–3.98)
VLDL: 30 MG/DL (ref 0–40)
WBC # BLD AUTO: 8 X10*3/UL (ref 4.4–11.3)

## 2024-02-05 PROCEDURE — 82043 UR ALBUMIN QUANTITATIVE: CPT

## 2024-02-05 PROCEDURE — 36415 COLL VENOUS BLD VENIPUNCTURE: CPT

## 2024-02-05 PROCEDURE — 82570 ASSAY OF URINE CREATININE: CPT

## 2024-02-05 PROCEDURE — 80061 LIPID PANEL: CPT

## 2024-02-05 PROCEDURE — 83036 HEMOGLOBIN GLYCOSYLATED A1C: CPT

## 2024-02-05 PROCEDURE — 76705 ECHO EXAM OF ABDOMEN: CPT

## 2024-02-05 PROCEDURE — 82105 ALPHA-FETOPROTEIN SERUM: CPT

## 2024-02-05 PROCEDURE — 85025 COMPLETE CBC W/AUTO DIFF WBC: CPT

## 2024-02-05 PROCEDURE — 82248 BILIRUBIN DIRECT: CPT

## 2024-02-05 PROCEDURE — 85610 PROTHROMBIN TIME: CPT

## 2024-02-05 PROCEDURE — 80053 COMPREHEN METABOLIC PANEL: CPT

## 2024-02-05 PROCEDURE — 84443 ASSAY THYROID STIM HORMONE: CPT

## 2024-02-05 PROCEDURE — 76705 ECHO EXAM OF ABDOMEN: CPT | Performed by: RADIOLOGY

## 2024-02-05 RX ORDER — DULAGLUTIDE 1.5 MG/.5ML
1.5 INJECTION, SOLUTION SUBCUTANEOUS
Qty: 6 ML | Refills: 3 | Status: SHIPPED | OUTPATIENT
Start: 2024-02-05 | End: 2024-04-29 | Stop reason: SDUPTHER

## 2024-02-06 LAB
CREAT UR-MCNC: 47 MG/DL (ref 20–320)
MICROALBUMIN UR-MCNC: 12.3 MG/L
MICROALBUMIN/CREAT UR: 26.2 UG/MG CREAT

## 2024-02-19 ENCOUNTER — OFFICE VISIT (OUTPATIENT)
Dept: PRIMARY CARE | Facility: CLINIC | Age: 69
End: 2024-02-19
Payer: MEDICARE

## 2024-02-19 ENCOUNTER — LAB (OUTPATIENT)
Dept: LAB | Facility: LAB | Age: 69
End: 2024-02-19
Payer: MEDICARE

## 2024-02-19 VITALS
SYSTOLIC BLOOD PRESSURE: 136 MMHG | HEIGHT: 64 IN | WEIGHT: 178 LBS | BODY MASS INDEX: 30.39 KG/M2 | DIASTOLIC BLOOD PRESSURE: 85 MMHG

## 2024-02-19 DIAGNOSIS — Z79.4 TYPE 2 DIABETES MELLITUS WITH STAGE 3A CHRONIC KIDNEY DISEASE, WITH LONG-TERM CURRENT USE OF INSULIN (MULTI): ICD-10-CM

## 2024-02-19 DIAGNOSIS — E11.22 TYPE 2 DIABETES MELLITUS WITH STAGE 3A CHRONIC KIDNEY DISEASE, WITH LONG-TERM CURRENT USE OF INSULIN (MULTI): ICD-10-CM

## 2024-02-19 DIAGNOSIS — I10 HYPERTENSION, UNSPECIFIED TYPE: ICD-10-CM

## 2024-02-19 DIAGNOSIS — N18.31 TYPE 2 DIABETES MELLITUS WITH STAGE 3A CHRONIC KIDNEY DISEASE, WITH LONG-TERM CURRENT USE OF INSULIN (MULTI): ICD-10-CM

## 2024-02-19 DIAGNOSIS — E78.5 HYPERLIPIDEMIA, UNSPECIFIED HYPERLIPIDEMIA TYPE: ICD-10-CM

## 2024-02-19 DIAGNOSIS — N34.2 INFECTIVE URETHRITIS: ICD-10-CM

## 2024-02-19 DIAGNOSIS — K74.60 CIRRHOSIS, NONALCOHOLIC (MULTI): ICD-10-CM

## 2024-02-19 DIAGNOSIS — N20.0 NEPHROLITHIASIS: ICD-10-CM

## 2024-02-19 DIAGNOSIS — N34.2 INFECTIVE URETHRITIS: Primary | ICD-10-CM

## 2024-02-19 DIAGNOSIS — K80.20 CALCULUS OF GALLBLADDER WITHOUT CHOLECYSTITIS WITHOUT OBSTRUCTION: ICD-10-CM

## 2024-02-19 DIAGNOSIS — N95.2 ATROPHIC VAGINITIS: ICD-10-CM

## 2024-02-19 LAB
POC APPEARANCE, URINE: CLEAR
POC BILIRUBIN, URINE: NEGATIVE
POC BLOOD, URINE: NEGATIVE
POC COLOR, URINE: YELLOW
POC GLUCOSE, URINE: NEGATIVE MG/DL
POC KETONES, URINE: NEGATIVE MG/DL
POC LEUKOCYTES, URINE: ABNORMAL
POC NITRITE,URINE: NEGATIVE
POC PH, URINE: 6 PH
POC PROTEIN, URINE: ABNORMAL MG/DL
POC SPECIFIC GRAVITY, URINE: 1.02
POC UROBILINOGEN, URINE: 0.2 EU/DL

## 2024-02-19 PROCEDURE — 81002 URINALYSIS NONAUTO W/O SCOPE: CPT | Performed by: FAMILY MEDICINE

## 2024-02-19 PROCEDURE — 1159F MED LIST DOCD IN RCRD: CPT | Performed by: FAMILY MEDICINE

## 2024-02-19 PROCEDURE — 1126F AMNT PAIN NOTED NONE PRSNT: CPT | Performed by: FAMILY MEDICINE

## 2024-02-19 PROCEDURE — 3044F HG A1C LEVEL LT 7.0%: CPT | Performed by: FAMILY MEDICINE

## 2024-02-19 PROCEDURE — 3061F NEG MICROALBUMINURIA REV: CPT | Performed by: FAMILY MEDICINE

## 2024-02-19 PROCEDURE — 1157F ADVNC CARE PLAN IN RCRD: CPT | Performed by: FAMILY MEDICINE

## 2024-02-19 PROCEDURE — 87086 URINE CULTURE/COLONY COUNT: CPT

## 2024-02-19 PROCEDURE — 99214 OFFICE O/P EST MOD 30 MIN: CPT | Performed by: FAMILY MEDICINE

## 2024-02-19 PROCEDURE — 3048F LDL-C <100 MG/DL: CPT | Performed by: FAMILY MEDICINE

## 2024-02-19 PROCEDURE — 3075F SYST BP GE 130 - 139MM HG: CPT | Performed by: FAMILY MEDICINE

## 2024-02-19 PROCEDURE — 1036F TOBACCO NON-USER: CPT | Performed by: FAMILY MEDICINE

## 2024-02-19 PROCEDURE — 87186 SC STD MICRODIL/AGAR DIL: CPT

## 2024-02-19 PROCEDURE — 3079F DIAST BP 80-89 MM HG: CPT | Performed by: FAMILY MEDICINE

## 2024-02-19 PROCEDURE — 1160F RVW MEDS BY RX/DR IN RCRD: CPT | Performed by: FAMILY MEDICINE

## 2024-02-19 PROCEDURE — 4010F ACE/ARB THERAPY RXD/TAKEN: CPT | Performed by: FAMILY MEDICINE

## 2024-02-19 RX ORDER — BISOPROLOL FUMARATE AND HYDROCHLOROTHIAZIDE 10; 6.25 MG/1; MG/1
1 TABLET ORAL DAILY
Qty: 90 TABLET | Refills: 1 | Status: SHIPPED | OUTPATIENT
Start: 2024-02-19 | End: 2024-05-09 | Stop reason: SDUPTHER

## 2024-02-19 RX ORDER — SIMVASTATIN 10 MG/1
10 TABLET, FILM COATED ORAL DAILY
Qty: 90 TABLET | Refills: 1 | Status: SHIPPED | OUTPATIENT
Start: 2024-02-19 | End: 2024-05-09 | Stop reason: SDUPTHER

## 2024-02-19 RX ORDER — PROPRANOLOL HYDROCHLORIDE 10 MG/1
20 TABLET ORAL 2 TIMES DAILY
Qty: 180 TABLET | Refills: 1 | Status: SHIPPED | OUTPATIENT
Start: 2024-02-19

## 2024-02-19 RX ORDER — NITROFURANTOIN 25; 75 MG/1; MG/1
100 CAPSULE ORAL 2 TIMES DAILY
Qty: 14 CAPSULE | Refills: 0 | Status: SHIPPED | OUTPATIENT
Start: 2024-02-19 | End: 2024-02-26

## 2024-02-19 RX ORDER — LOSARTAN POTASSIUM 50 MG/1
50 TABLET ORAL DAILY
Qty: 90 TABLET | Refills: 1 | Status: SHIPPED | OUTPATIENT
Start: 2024-02-19

## 2024-02-19 ASSESSMENT — ENCOUNTER SYMPTOMS
OCCASIONAL FEELINGS OF UNSTEADINESS: 0
LOSS OF SENSATION IN FEET: 0
DEPRESSION: 0

## 2024-02-19 NOTE — PROGRESS NOTES
"Subjective   Patient ID: Barbara A Haase is a 68 y.o. female who presents for Follow-up and pubic region area.      HPI  Has had ordor urine  Has a small bump pubic area  Current Outpatient Medications on File Prior to Visit   Medication Sig Dispense Refill    BD Sarah 2nd Gen Pen Needle 32 gauge x 5/32\" needle USE WITH INSULIN 4 TIMES DAILY      dulaglutide (Trulicity) 1.5 mg/0.5 mL pen injector injection Inject 1.5 mg under the skin 1 (one) time per week. 6 mL 3    FreeStyle Lucero 2 Sensor kit Inject under the skin if needed.      furosemide (Lasix) 20 mg tablet Take 1 tablet (20 mg) by mouth once daily. 90 tablet 1    insulin aspart (NovoLOG) 100 unit/mL (3 mL) pen Use with meals up to 40 units a day 40 mL 3    Lantus Solostar U-100 Insulin 100 unit/mL (3 mL) pen INJECT 66 UNIT Daily      levothyroxine (Synthroid, Levoxyl) 112 mcg tablet Take 1 tablet (112 mcg) by mouth once daily. 90 tablet 1    metFORMIN (Glucophage) 1,000 mg tablet Take 1 tablet (1,000 mg) by mouth 2 times a day. 180 tablet 3    [DISCONTINUED] bisoproloL-hydrochlorothiazide (Ziac) 10-6.25 mg tablet Take 1 tablet by mouth once daily. 90 tablet 1    [DISCONTINUED] losartan (Cozaar) 50 mg tablet Take 1 tablet (50 mg) by mouth once daily. 90 tablet 1    [DISCONTINUED] propranolol (Inderal) 10 mg tablet Take 2 tablets (20 mg) by mouth 2 times a day. 180 tablet 1    [DISCONTINUED] simvastatin (Zocor) 10 mg tablet Take 1 tablet (10 mg) by mouth once daily. 90 tablet 1     No current facility-administered medications on file prior to visit.        Review of Systems   Constitutional:  Negative for chills and fever.   HENT: Negative.     Respiratory: Negative.     Cardiovascular: Negative.    Gastrointestinal: Negative.  Negative for nausea and vomiting.   Endocrine: Negative.    Genitourinary: Negative.    Musculoskeletal: Negative.    Skin: Negative.  Negative for rash.   Allergic/Immunologic: Negative.    Neurological: Negative.    Hematological: " "Negative.    Psychiatric/Behavioral: Negative.     All other systems reviewed and are negative.      Objective   Blood Pressure 136/85   Height 1.626 m (5' 4\")   Weight 80.7 kg (178 lb)   Body Mass Index 30.55 kg/m²   BSA: 1.91 meters squared  Growth percentiles: Facility age limit for growth %tiffani is 20 years. Facility age limit for growth %tiffani is 20 years.   No visits with results within 1 Week(s) from this visit.   Latest known visit with results is:   Lab on 02/05/2024   Component Date Value Ref Range Status    Glucose 02/05/2024 98  74 - 99 mg/dL Final    Sodium 02/05/2024 140  136 - 145 mmol/L Final    Potassium 02/05/2024 4.6  3.5 - 5.3 mmol/L Final    Chloride 02/05/2024 100  98 - 107 mmol/L Final    Bicarbonate 02/05/2024 30  21 - 32 mmol/L Final    Anion Gap 02/05/2024 15  10 - 20 mmol/L Final    Urea Nitrogen 02/05/2024 32 (H)  6 - 23 mg/dL Final    Creatinine 02/05/2024 1.02  0.50 - 1.05 mg/dL Final    eGFR 02/05/2024 60 (L)  >60 mL/min/1.73m*2 Final    Calculations of estimated GFR are performed using the 2021 CKD-EPI Study Refit equation without the race variable for the IDMS-Traceable creatinine methods.  https://jasn.asnjournals.org/content/early/2021/09/22/ASN.7011202995    Calcium 02/05/2024 10.2  8.6 - 10.6 mg/dL Final    Albumin 02/05/2024 4.5  3.4 - 5.0 g/dL Final    Alkaline Phosphatase 02/05/2024 58  33 - 136 U/L Final    Total Protein 02/05/2024 7.4  6.4 - 8.2 g/dL Final    AST 02/05/2024 20  9 - 39 U/L Final    Bilirubin, Total 02/05/2024 0.6  0.0 - 1.2 mg/dL Final    ALT 02/05/2024 18  7 - 45 U/L Final    Patients treated with Sulfasalazine may generate falsely decreased results for ALT.    Cholesterol 02/05/2024 124  0 - 199 mg/dL Final          Age      Desirable   Borderline High   High     0-19 Y     0 - 169       170 - 199     >/= 200    20-24 Y     0 - 189       190 - 224     >/= 225         >24 Y     0 - 199       200 - 239     >/= 240   **All ranges are based on fasting samples. " Specific   therapeutic targets will vary based on patient-specific   cardiac risk.    Pediatric guidelines reference:Pediatrics 2011, 128(S5).Adult guidelines reference: NCEP ATPIII Guidelines,LESLIE 2001, 258:2486-97    Venipuncture immediately after or during the administration of Metamizole may lead to falsely low results. Testing should be performed immediately prior to Metamizole dosing.    HDL-Cholesterol 02/05/2024 43.9  mg/dL Final      Age       Very Low   Low     Normal    High    0-19 Y    < 35      < 40     40-45     ----  20-24 Y    ----     < 40      >45      ----        >24 Y      ----     < 40     40-60      >60      Cholesterol/HDL Ratio 02/05/2024 2.8   Final      Ref Values  Desirable  < 3.4  High Risk  > 5.0    LDL Calculated 02/05/2024 50  <=99 mg/dL Final                                Near   Borderline      AGE      Desirable  Optimal    High     High     Very High     0-19 Y     0 - 109     ---    110-129   >/= 130     ----    20-24 Y     0 - 119     ---    120-159   >/= 160     ----      >24 Y     0 -  99   100-129  130-159   160-189     >/=190      VLDL 02/05/2024 30  0 - 40 mg/dL Final    Triglycerides 02/05/2024 151 (H)  0 - 149 mg/dL Final       Age         Desirable   Borderline High   High     Very High   0 D-90 D    19 - 174         ----         ----        ----  91 D- 9 Y     0 -  74        75 -  99     >/= 100      ----    10-19 Y     0 -  89        90 - 129     >/= 130      ----    20-24 Y     0 - 114       115 - 149     >/= 150      ----         >24 Y     0 - 149       150 - 199    200- 499    >/= 500    Venipuncture immediately after or during the administration of Metamizole may lead to falsely low results. Testing should be performed immediately prior to Metamizole dosing.    Non HDL Cholesterol 02/05/2024 80  0 - 149 mg/dL Final          Age       Desirable   Borderline High   High     Very High     0-19 Y     0 - 119       120 - 144     >/= 145    >/= 160    20-24 Y     0 - 149        150 - 189     >/= 190      ----         >24 Y    30 mg/dL above LDL Cholesterol goal      Hemoglobin A1C 02/05/2024 5.9 (H)  see below % Final    Estimated Average Glucose 02/05/2024 123  Not Established mg/dL Final    Thyroid Stimulating Hormone 02/05/2024 2.50  0.44 - 3.98 mIU/L Final    Alpha-Fetoprotein 02/05/2024 <4  0 - 9 ng/mL Final    WBC 02/05/2024 8.0  4.4 - 11.3 x10*3/uL Final    nRBC 02/05/2024 0.0  0.0 - 0.0 /100 WBCs Final    RBC 02/05/2024 4.49  4.00 - 5.20 x10*6/uL Final    Hemoglobin 02/05/2024 13.0  12.0 - 16.0 g/dL Final    Hematocrit 02/05/2024 40.4  36.0 - 46.0 % Final    MCV 02/05/2024 90  80 - 100 fL Final    MCH 02/05/2024 29.0  26.0 - 34.0 pg Final    MCHC 02/05/2024 32.2  32.0 - 36.0 g/dL Final    RDW 02/05/2024 13.5  11.5 - 14.5 % Final    Platelets 02/05/2024 239  150 - 450 x10*3/uL Final    Neutrophils % 02/05/2024 62.0  40.0 - 80.0 % Final    Immature Granulocytes %, Automated 02/05/2024 0.3  0.0 - 0.9 % Final    Immature Granulocyte Count (IG) includes promyelocytes, myelocytes and metamyelocytes but does not include bands. Percent differential counts (%) should be interpreted in the context of the absolute cell counts (cells/UL).    Lymphocytes % 02/05/2024 27.5  13.0 - 44.0 % Final    Monocytes % 02/05/2024 6.8  2.0 - 10.0 % Final    Eosinophils % 02/05/2024 2.5  0.0 - 6.0 % Final    Basophils % 02/05/2024 0.9  0.0 - 2.0 % Final    Neutrophils Absolute 02/05/2024 4.94  1.20 - 7.70 x10*3/uL Final    Percent differential counts (%) should be interpreted in the context of the absolute cell counts (cells/uL).    Immature Granulocytes Absolute, Au* 02/05/2024 0.02  0.00 - 0.70 x10*3/uL Final    Lymphocytes Absolute 02/05/2024 2.19  1.20 - 4.80 x10*3/uL Final    Monocytes Absolute 02/05/2024 0.54  0.10 - 1.00 x10*3/uL Final    Eosinophils Absolute 02/05/2024 0.20  0.00 - 0.70 x10*3/uL Final    Basophils Absolute 02/05/2024 0.07  0.00 - 0.10 x10*3/uL Final    Protime 02/05/2024 11.5   9.8 - 12.8 seconds Final    INR 02/05/2024 1.0  0.9 - 1.1 Final    Bilirubin, Direct 02/05/2024 0.1  0.0 - 0.3 mg/dL Final    Albumin, Urine Random 02/05/2024 12.3  Not established mg/L Final    Creatinine, Urine Random 02/05/2024 47.0  20.0 - 320.0 mg/dL Final    Albumin/Creatine Ratio 02/05/2024 26.2  <30.0 ug/mg Creat Final      Physical Exam  Vitals and nursing note reviewed.   Constitutional:       Appearance: Normal appearance.   HENT:      Head: Normocephalic and atraumatic.      Right Ear: Tympanic membrane normal.      Left Ear: Tympanic membrane normal.      Nose: Nose normal.      Mouth/Throat:      Mouth: Mucous membranes are moist.   Eyes:      Pupils: Pupils are equal, round, and reactive to light.   Cardiovascular:      Rate and Rhythm: Normal rate and regular rhythm.      Pulses: Normal pulses.      Heart sounds: Normal heart sounds.   Pulmonary:      Effort: Pulmonary effort is normal.      Breath sounds: Normal breath sounds.   Abdominal:      General: Abdomen is flat. Bowel sounds are normal.      Palpations: Abdomen is soft.   Musculoskeletal:         General: Normal range of motion.      Cervical back: Normal range of motion and neck supple.   Skin:     General: Skin is warm and dry.      Capillary Refill: Capillary refill takes less than 2 seconds.   Neurological:      General: No focal deficit present.      Mental Status: She is alert and oriented to person, place, and time.   Psychiatric:         Mood and Affect: Mood normal.         Assessment/Plan   Problem List Items Addressed This Visit             ICD-10-CM    Cirrhosis, nonalcoholic (CMS/HCC) K74.60     Follow up with Hepatology         Diabetes mellitus, type 2 (CMS/HCC) E11.9     Continue follow-up with endocrinology         Hyperlipidemia E78.5     Check lipid panel continue medications continue healthy eating work out  150 minutes a week          Relevant Medications    simvastatin (Zocor) 10 mg tablet    Hypertension I10      Patient's blood pressure is at goal of 130/85 or less. Condition is stable. Continue current medications and treatment plan.  I recommend that you exercise for 30-45 minutes 5 days a week.  I also recommend a balanced diet with fruits and vegetables every day, lean meats, and little fried foods. The DASH diet (you can find this online) is a good example of this.          Relevant Medications    bisoproloL-hydrochlorothiazide (Ziac) 10-6.25 mg tablet    propranolol (Inderal) 10 mg tablet    losartan (Cozaar) 50 mg tablet    Nephrolithiasis N20.0    Relevant Orders    Referral to Urology    Calculus of gallbladder without cholecystitis without obstruction K80.20    Relevant Orders    Referral to General Surgery    Infective urethritis - Primary N34.2    Relevant Medications    nitrofurantoin, macrocrystal-monohydrate, (Macrobid) 100 mg capsule    Other Relevant Orders    Urine Culture (Completed)    Atrophic vaginitis N95.2    Relevant Medications    estrogens, conjugated, (Premarin) vaginal cream    Other Relevant Orders    POCT UA (nonautomated) manually resulted (Completed)

## 2024-02-22 LAB — BACTERIA UR CULT: ABNORMAL

## 2024-02-25 PROBLEM — N95.2 ATROPHIC VAGINITIS: Status: ACTIVE | Noted: 2024-02-25

## 2024-02-25 PROBLEM — N34.2 INFECTIVE URETHRITIS: Status: ACTIVE | Noted: 2024-02-25

## 2024-02-25 PROBLEM — N20.0 NEPHROLITHIASIS: Status: ACTIVE | Noted: 2024-02-25

## 2024-02-25 PROBLEM — K80.20 CALCULUS OF GALLBLADDER WITHOUT CHOLECYSTITIS WITHOUT OBSTRUCTION: Status: ACTIVE | Noted: 2024-02-25

## 2024-02-25 ASSESSMENT — ENCOUNTER SYMPTOMS
VOMITING: 0
HEMATOLOGIC/LYMPHATIC NEGATIVE: 1
NEUROLOGICAL NEGATIVE: 1
CARDIOVASCULAR NEGATIVE: 1
MUSCULOSKELETAL NEGATIVE: 1
GASTROINTESTINAL NEGATIVE: 1
NAUSEA: 0
ENDOCRINE NEGATIVE: 1
ALLERGIC/IMMUNOLOGIC NEGATIVE: 1
PSYCHIATRIC NEGATIVE: 1
FEVER: 0
CHILLS: 0
RESPIRATORY NEGATIVE: 1

## 2024-03-09 ASSESSMENT — CUP TO DISC RATIO
OS_RATIO: .25
OD_RATIO: .2

## 2024-03-09 ASSESSMENT — EXTERNAL EXAM - LEFT EYE: OS_EXAM: NORMAL

## 2024-03-09 ASSESSMENT — EXTERNAL EXAM - RIGHT EYE: OD_EXAM: NORMAL

## 2024-03-09 ASSESSMENT — SLIT LAMP EXAM - LIDS
COMMENTS: GOOD POSITION
COMMENTS: GOOD POSITION

## 2024-03-09 NOTE — PROGRESS NOTES
Diabetes sddqggotM79.9  Mild nonproliferative diabetic retinopathy of both eyes without macular [id number]  -OCT macula (3/11/24) - SS: 7/10 OU. Normal thickness and contour OU. Intact IS-OS OU. Possible tiny pocket of edema OD, no edema OS. 253/269. Stable from 7/26/23, possible early pocket of edema OD compared to previous.   -HbA1c= 5.9 (2/5/24).   -Mild nonproliferative diabetic retinopathy seen on exam - stable. Will need to monitor macular edema.   -Per patient, history of laser OU for DM - possibly focal laser? No PRP seen on exam.   -Continue close monitoring of blood glucose, blood pressure, and cholesterol.   -F/u 6 months - for comprehensive exam with refraction, dilation, and OCT macula OU.     Pseudophakia of both eyesZ96.1  History of YAG laser capsulotomy of lensZ98.49  -Stable. Good vision. Monitor.     LnealubgdG33.00  YtmcnsmgmxzL78.209  UbzvrgdnyaY14.4  -Rx given 3/2024 - patient wishes to get new NVO. Only has NVO, does not wear glasses for driving - uncorrected binocular distance vision 20/20.      No FH of AMD/glaucoma      Attending Attestation Statement for Evaluation and Management Services:    I was physically present and/or personally examined the patient during the evaluation of this patient. I reviewed the documentation and confirm the resident's note.

## 2024-03-11 ENCOUNTER — OFFICE VISIT (OUTPATIENT)
Dept: OPHTHALMOLOGY | Facility: CLINIC | Age: 69
End: 2024-03-11
Payer: MEDICARE

## 2024-03-11 ENCOUNTER — APPOINTMENT (OUTPATIENT)
Dept: SURGERY | Facility: CLINIC | Age: 69
End: 2024-03-11
Payer: MEDICARE

## 2024-03-11 DIAGNOSIS — H52.203 ASTIGMATISM OF BOTH EYES, UNSPECIFIED TYPE: ICD-10-CM

## 2024-03-11 DIAGNOSIS — E11.3293 MILD NONPROLIFERATIVE DIABETIC RETINOPATHY OF BOTH EYES WITHOUT MACULAR EDEMA ASSOCIATED WITH TYPE 2 DIABETES MELLITUS (MULTI): Primary | ICD-10-CM

## 2024-03-11 DIAGNOSIS — H52.03 HYPEROPIA, BILATERAL: ICD-10-CM

## 2024-03-11 DIAGNOSIS — H52.4 PRESBYOPIA: ICD-10-CM

## 2024-03-11 DIAGNOSIS — Z96.1 PSEUDOPHAKIA: ICD-10-CM

## 2024-03-11 PROCEDURE — 92015 DETERMINE REFRACTIVE STATE: CPT | Performed by: OPHTHALMOLOGY

## 2024-03-11 PROCEDURE — 92134 CPTRZ OPH DX IMG PST SGM RTA: CPT | Performed by: OPHTHALMOLOGY

## 2024-03-11 PROCEDURE — 99213 OFFICE O/P EST LOW 20 MIN: CPT | Performed by: OPHTHALMOLOGY

## 2024-03-11 ASSESSMENT — ENCOUNTER SYMPTOMS
MUSCULOSKELETAL NEGATIVE: 0
NEUROLOGICAL NEGATIVE: 0
PSYCHIATRIC NEGATIVE: 0
ENDOCRINE NEGATIVE: 0
CARDIOVASCULAR NEGATIVE: 0
CONSTITUTIONAL NEGATIVE: 0
GASTROINTESTINAL NEGATIVE: 0
RESPIRATORY NEGATIVE: 0
HEMATOLOGIC/LYMPHATIC NEGATIVE: 0
EYES NEGATIVE: 0
ALLERGIC/IMMUNOLOGIC NEGATIVE: 0

## 2024-03-11 ASSESSMENT — CONF VISUAL FIELD
OS_INFERIOR_TEMPORAL_RESTRICTION: 0
OD_SUPERIOR_NASAL_RESTRICTION: 0
OS_INFERIOR_NASAL_RESTRICTION: 0
OS_SUPERIOR_TEMPORAL_RESTRICTION: 0
OD_NORMAL: 1
OD_SUPERIOR_TEMPORAL_RESTRICTION: 0
OD_INFERIOR_TEMPORAL_RESTRICTION: 0
OD_INFERIOR_NASAL_RESTRICTION: 0
OS_SUPERIOR_NASAL_RESTRICTION: 0
OS_NORMAL: 1

## 2024-03-11 ASSESSMENT — REFRACTION_MANIFEST
OS_SPHERE: +1.25
OD_SPHERE: +0.75
OD_AXIS: 090
OD_CYLINDER: -1.25
OS_CYLINDER: -1.25
OS_AXIS: 115
OD_ADD: +2.50
OS_ADD: +2.50

## 2024-03-11 ASSESSMENT — VISUAL ACUITY
OS_CC: 20/20
OD_CC: 20/25
OS_CC+: -2
METHOD: SNELLEN - LINEAR
OD_CC+: -2
CORRECTION_TYPE: GLASSES

## 2024-03-11 ASSESSMENT — REFRACTION_WEARINGRX
OS_CYLINDER: -1.25
OS_SPHERE: +3.25
OD_AXIS: 090
OD_SPHERE: +3.50
OD_CYLINDER: -1.25
OD_ADD: NVO RX
OS_AXIS: 115

## 2024-03-11 ASSESSMENT — TONOMETRY
OD_IOP_MMHG: 14
OS_IOP_MMHG: 15
IOP_METHOD: GOLDMANN APPLANATION

## 2024-03-13 ENCOUNTER — OFFICE VISIT (OUTPATIENT)
Dept: SURGERY | Facility: CLINIC | Age: 69
End: 2024-03-13
Payer: MEDICARE

## 2024-03-13 VITALS
TEMPERATURE: 96 F | HEIGHT: 64 IN | BODY MASS INDEX: 30.39 KG/M2 | SYSTOLIC BLOOD PRESSURE: 147 MMHG | DIASTOLIC BLOOD PRESSURE: 75 MMHG | WEIGHT: 178 LBS

## 2024-03-13 DIAGNOSIS — K80.20 CALCULUS OF GALLBLADDER WITHOUT CHOLECYSTITIS WITHOUT OBSTRUCTION: ICD-10-CM

## 2024-03-13 PROCEDURE — 3044F HG A1C LEVEL LT 7.0%: CPT | Performed by: SURGERY

## 2024-03-13 PROCEDURE — 3061F NEG MICROALBUMINURIA REV: CPT | Performed by: SURGERY

## 2024-03-13 PROCEDURE — 3078F DIAST BP <80 MM HG: CPT | Performed by: SURGERY

## 2024-03-13 PROCEDURE — 99213 OFFICE O/P EST LOW 20 MIN: CPT | Performed by: SURGERY

## 2024-03-13 PROCEDURE — 1159F MED LIST DOCD IN RCRD: CPT | Performed by: SURGERY

## 2024-03-13 PROCEDURE — 1157F ADVNC CARE PLAN IN RCRD: CPT | Performed by: SURGERY

## 2024-03-13 PROCEDURE — 3077F SYST BP >= 140 MM HG: CPT | Performed by: SURGERY

## 2024-03-13 PROCEDURE — 1160F RVW MEDS BY RX/DR IN RCRD: CPT | Performed by: SURGERY

## 2024-03-13 PROCEDURE — 4010F ACE/ARB THERAPY RXD/TAKEN: CPT | Performed by: SURGERY

## 2024-03-13 PROCEDURE — 3048F LDL-C <100 MG/DL: CPT | Performed by: SURGERY

## 2024-03-13 PROCEDURE — 1126F AMNT PAIN NOTED NONE PRSNT: CPT | Performed by: SURGERY

## 2024-03-13 PROCEDURE — 1036F TOBACCO NON-USER: CPT | Performed by: SURGERY

## 2024-03-13 ASSESSMENT — ENCOUNTER SYMPTOMS: DEPRESSION: 0

## 2024-03-13 ASSESSMENT — PAIN SCALES - GENERAL: PAINLEVEL: 0-NO PAIN

## 2024-03-13 NOTE — PROGRESS NOTES
Subjective   Patient ID: Barbara A Haase is a 68 y.o. female who presents for Cholelithiasis.  HPI  Patient is a very pleasant 68-year-old female with multiple medical comorbid conditions to include SERNA cirrhosis.  She has been followed by hepatologist for this and on several imaging studies she is noted to have gallstones.  She is asymptomatic with respect to her gallstones but she comes in for an opinion regarding prophylactic cholecystectomy given the fact that she travels extensively.  She is concerned that she may have a gallbladder attack on an international trip.    Again, she is asymptomatic and does not report any signs or symptoms of biliary colic.      Review of Systems     On review of systems patient denies any weight loss, fever, change in bowel habits, melena, hematochezia, coronary artery disease,  ,  , TIA/CVA, bleeding, jaundice, pancreatitis, hepatitis.    Patient does not smoke. Patient does not drink     She is retired.  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    Disease of thyroid gland     Hypertension     Personal history of other diseases of the digestive system     History of appendicitis    Personal history of other endocrine, nutritional and metabolic disease     History of diabetic retinopathy    Postprocedural hypothyroidism     H/O thyroidectomy    Right lower quadrant pain 07/08/2021    Acute right lower quadrant pain    Type 2 diabetes mellitus without complications (CMS/HCC)     Diabetes mellitus, stable    Varicella     Visual impairment         Past Surgical History:   Procedure Laterality Date    APPENDECTOMY  2022    BREAST BIOPSY      HYSTERECTOMY      JOINT REPLACEMENT      OTHER SURGICAL HISTORY  07/16/2021     Hysterectomy    OTHER SURGICAL HISTORY  07/16/2021    Appendectomy    OTHER SURGICAL HISTORY  07/16/2021    Thyroidectomy    OTHER SURGICAL HISTORY  04/25/2022    YAG laser capsulotomy    OTHER SURGICAL HISTORY  04/25/2022    Cataract surgery    OTHER SURGICAL HISTORY  01/06/2022    Knee replacement    OTHER SURGICAL HISTORY  01/06/2022    Tonsillectomy    TONSILLECTOMY      WISDOM TOOTH EXTRACTION          Objective     Physical Exam    Well-nourished, well-developed. In no distress  Alert and oriented x 3  Lungs are clear to auscultation bilaterally.  Cardiac exam is regular rhythm and rate.  Abdomen is soft nontender nondistended.  Neurologic exam is without focal deficit.    Assessment/Plan     === 02/05/24 ===    US ABDOMEN LIMITED LIVER    - Impression -  Echogenic fatty infiltrated liver. No definite focal liver lesions.    Cholelithiasis.    Suboptimal visualization of the pancreas.    Nephrolithiasis.     Lab Results   Component Value Date    GLUCOSE 98 02/05/2024     02/05/2024    K 4.6 02/05/2024     02/05/2024    CO2 30 02/05/2024    ANIONGAP 15 02/05/2024    BUN 32 (H) 02/05/2024    CREATININE 1.02 02/05/2024    EGFR 60 (L) 02/05/2024    CALCIUM 10.2 02/05/2024    ALBUMIN 4.5 02/05/2024    ALKPHOS 58 02/05/2024    PROT 7.4 02/05/2024    AST 20 02/05/2024    BILITOT 0.6 02/05/2024    ALT 18 02/05/2024           Impression: 68-year-old female with asymptomatic gallstones.  In general recommendation is not to offer surgery.    I would like to get the opinion from her hepatologist, Dr. Tin Baltazar.  She has an upcoming appointment with Dr Baltazar.  She is instructed to give me a call back if he were to recommend surgery    Otherwise I'd like to see Ms. Haase back in 6 months for follow-up

## 2024-03-13 NOTE — LETTER
March 13, 2024     Jese Waite MD  [address]  [facility] [address]  [facility] [address]    Patient: Barbara A Haase   YOB: 1955   Date of Visit: 3/13/2024       Dear Dr. Jese Waite MD:    Thank you for referring Barbara Haase to me for evaluation. Below are my notes for this consultation.  If you have questions, please do not hesitate to call me. I look forward to following your patient along with you.       Sincerely,     Patel Scott MD      CC: Tin Baltazar MD  ______________________________________________________________________________________    Subjective  Patient ID: Barbara A Haase is a 68 y.o. female who presents for Cholelithiasis.  HPI  Patient is a very pleasant 68-year-old female with multiple medical comorbid conditions to include SERNA cirrhosis.  She has been followed by hepatologist for this and on several imaging studies she is noted to have gallstones.  She is asymptomatic with respect to her gallstones but she comes in for an opinion regarding prophylactic cholecystectomy given the fact that she travels extensively.  She is concerned that she may have a gallbladder attack on an international trip.    Again, she is asymptomatic and does not report any signs or symptoms of biliary colic.      Review of Systems     On review of systems patient denies any weight loss, fever, change in bowel habits, melena, hematochezia, coronary artery disease,  ,  , TIA/CVA, bleeding, jaundice, pancreatitis, hepatitis.    Patient does not smoke. Patient does not drink     She is retired.  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   • Disease of thyroid gland    • Hypertension    • Personal history of other diseases of the digestive system   Restorative Technician Note      Patient Name: Alejandro Tracey     Note Type: Mobility  Patient Position Upon Consult: Supine  Activity Performed: Repositioned  Patient Position at End of Consult: Seated edge of bed;Bed/Chair alarm activated    History of appendicitis   • Personal history of other endocrine, nutritional and metabolic disease     History of diabetic retinopathy   • Postprocedural hypothyroidism     H/O thyroidectomy   • Right lower quadrant pain 07/08/2021    Acute right lower quadrant pain   • Type 2 diabetes mellitus without complications (CMS/HCC)     Diabetes mellitus, stable   • Varicella    • Visual impairment         Past Surgical History:   Procedure Laterality Date   • APPENDECTOMY  2022   • BREAST BIOPSY     • HYSTERECTOMY     • JOINT REPLACEMENT     • OTHER SURGICAL HISTORY  07/16/2021    Hysterectomy   • OTHER SURGICAL HISTORY  07/16/2021    Appendectomy   • OTHER SURGICAL HISTORY  07/16/2021    Thyroidectomy   • OTHER SURGICAL HISTORY  04/25/2022    YAG laser capsulotomy   • OTHER SURGICAL HISTORY  04/25/2022    Cataract surgery   • OTHER SURGICAL HISTORY  01/06/2022    Knee replacement   • OTHER SURGICAL HISTORY  01/06/2022    Tonsillectomy   • TONSILLECTOMY     • WISDOM TOOTH EXTRACTION          Objective    Physical Exam    Well-nourished, well-developed. In no distress  Alert and oriented x 3  Lungs are clear to auscultation bilaterally.  Cardiac exam is regular rhythm and rate.  Abdomen is soft nontender nondistended.  Neurologic exam is without focal deficit.    Assessment/Plan    === 02/05/24 ===    US ABDOMEN LIMITED LIVER    - Impression -  Echogenic fatty infiltrated liver. No definite focal liver lesions.    Cholelithiasis.    Suboptimal visualization of the pancreas.    Nephrolithiasis.     Lab Results   Component Value Date    GLUCOSE 98 02/05/2024     02/05/2024    K 4.6 02/05/2024     02/05/2024    CO2 30 02/05/2024    ANIONGAP 15 02/05/2024    BUN 32 (H) 02/05/2024    CREATININE 1.02 02/05/2024    EGFR 60 (L) 02/05/2024    CALCIUM 10.2 02/05/2024    ALBUMIN 4.5 02/05/2024    ALKPHOS 58 02/05/2024    PROT 7.4 02/05/2024    AST 20 02/05/2024    BILITOT 0.6 02/05/2024    ALT 18 02/05/2024            Impression: 68-year-old female with asymptomatic gallstones.  In general recommendation is not to offer surgery.    I would like to get the opinion from her hepatologist, Dr. Tin Baltazar.  She has an upcoming appointment with Dr Baltazar.  She is instructed to give me a call back if he were to recommend surgery    Otherwise I'd like to see Ms. Haase back in 6 months for follow-up

## 2024-03-29 ENCOUNTER — OFFICE VISIT (OUTPATIENT)
Dept: UROLOGY | Facility: CLINIC | Age: 69
End: 2024-03-29
Payer: MEDICARE

## 2024-03-29 DIAGNOSIS — N20.0 NEPHROLITHIASIS: Primary | ICD-10-CM

## 2024-03-29 DIAGNOSIS — N95.2 ATROPHIC VAGINITIS: ICD-10-CM

## 2024-03-29 DIAGNOSIS — N34.2 INFECTIVE URETHRITIS: ICD-10-CM

## 2024-03-29 PROCEDURE — 1157F ADVNC CARE PLAN IN RCRD: CPT | Performed by: STUDENT IN AN ORGANIZED HEALTH CARE EDUCATION/TRAINING PROGRAM

## 2024-03-29 PROCEDURE — 3048F LDL-C <100 MG/DL: CPT | Performed by: STUDENT IN AN ORGANIZED HEALTH CARE EDUCATION/TRAINING PROGRAM

## 2024-03-29 PROCEDURE — 3061F NEG MICROALBUMINURIA REV: CPT | Performed by: STUDENT IN AN ORGANIZED HEALTH CARE EDUCATION/TRAINING PROGRAM

## 2024-03-29 PROCEDURE — 1159F MED LIST DOCD IN RCRD: CPT | Performed by: STUDENT IN AN ORGANIZED HEALTH CARE EDUCATION/TRAINING PROGRAM

## 2024-03-29 PROCEDURE — 1160F RVW MEDS BY RX/DR IN RCRD: CPT | Performed by: STUDENT IN AN ORGANIZED HEALTH CARE EDUCATION/TRAINING PROGRAM

## 2024-03-29 PROCEDURE — 4010F ACE/ARB THERAPY RXD/TAKEN: CPT | Performed by: STUDENT IN AN ORGANIZED HEALTH CARE EDUCATION/TRAINING PROGRAM

## 2024-03-29 PROCEDURE — 99204 OFFICE O/P NEW MOD 45 MIN: CPT | Performed by: STUDENT IN AN ORGANIZED HEALTH CARE EDUCATION/TRAINING PROGRAM

## 2024-03-29 PROCEDURE — 3044F HG A1C LEVEL LT 7.0%: CPT | Performed by: STUDENT IN AN ORGANIZED HEALTH CARE EDUCATION/TRAINING PROGRAM

## 2024-03-29 NOTE — PROGRESS NOTES
Scribed for Dr. Robby Ramsey by Norbert Hernandez. I, Dr. Robby Ramsey have personally reviewed and agreed with the information entered by the Virtual Scribe. 03/29/24.    This visit was completed via telemedicine. All issues as below were discussed and addressed but no physical exam was performed unless allowed by visual confirmation. If it was felt that the patient should be evaluated in clinic, then they were directed there. Patient verbal consented to the visit.    ASSESSMENT:  Problem List Items Addressed This Visit       Nephrolithiasis - Primary    Relevant Orders    XR abdomen 1 view    Infective urethritis    Atrophic vaginitis      PLAN:  Discussed treatment options for her non-obstructing right renal stone. [RUP; 9 mm]  She would like to pursue removal with surgical intervention.  Will proceed with a KUB, schedule follow up to review results.   Based on results will determine definitive course of treatment whether ESWL vs URS next visit.     RTC in 1-2 weeks for ongoing planning.     All questions were answered to the patient’s satisfaction.  Patient agrees with the plan and wishes to proceed.  Continue follow-up for ongoing care of her chronic medical conditions.       History of Present Illness (HPI):  Anna presents virtually as a new patient for an evaluation.  The patient’s EMR has been reviewed.  Lives in [place], [place] with her .     Referred by Dr. Waite (PCP) for history of nephrolithiasis.     Recently underwent a abdominal (liver) ultrasound for her SERNA. (Feb 2024)  Noted a non-obstructing RUP stone (9 mm).   Reviewed her radiographic imaging extending back to 2021.  CT A&P (07/08/21) showed a non-obstructing RUP stone (6-7 mm) at that time.     TODAY: (03/29/24)  Reports that she has been doing well overall.   Radiographic imaging results shared with patient.   Currently asymptomatic with no stone-related discomfort.   Denies passing any previous stones in the past.   Denies any  stone-related surgeries.    She frequently travels, at times internationally.   Has an upcoming trip scheduled to [facility].  She is concerned that her stone may pass during one of her upcoming trips.   To avoid this, she would like to undergo a procedure to have her stone removed.     Denotes history of Vasiliy's in the setting of vaginal atrophy.   Continues follow up with her PCP for management.     PMH: HTN, T2DM, hypothyroidism.  PSH: Hysterectomy, thyroidectomy, appendectomy, tonsillectomy.   FH: Ovarian cancer (Mother).   SH: Non-smoker.     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/19/2023    Disease of thyroid gland     Hypertension     Personal history of other diseases of the digestive system     History of appendicitis    Personal history of other endocrine, nutritional and metabolic disease     History of diabetic retinopathy    Postprocedural hypothyroidism     H/O thyroidectomy    Right lower quadrant pain 07/08/2021    Acute right lower quadrant pain    Type 2 diabetes mellitus without complications (CMS/HCC)     Diabetes mellitus, stable    Varicella     Visual impairment      Past Surgical History:   Procedure Laterality Date    APPENDECTOMY  2022    BREAST BIOPSY      HYSTERECTOMY      JOINT REPLACEMENT      OTHER SURGICAL HISTORY  07/16/2021    Hysterectomy    OTHER SURGICAL HISTORY  07/16/2021    Appendectomy    OTHER SURGICAL HISTORY  07/16/2021    Thyroidectomy    OTHER SURGICAL HISTORY  04/25/2022    YAG laser capsulotomy    OTHER SURGICAL HISTORY  04/25/2022    Cataract surgery    OTHER SURGICAL HISTORY  01/06/2022    Knee replacement    OTHER SURGICAL HISTORY  01/06/2022    Tonsillectomy    TONSILLECTOMY      WISDOM TOOTH EXTRACTION       Family History   Problem  "Relation Name Age of Onset    Hypertension Mother Alee Hung     Ovarian cancer Mother Alee Hung     Cancer Mother Alee Hung     Hypertension Father Narayan Hung     Arthritis Father Narayan Hung     Cancer Father Narayan Hung     Diabetes Father Narayan Hung      Social History     Tobacco Use   Smoking Status Never   Smokeless Tobacco Never     Current Outpatient Medications   Medication Sig Dispense Refill    BD Sarah 2nd Gen Pen Needle 32 gauge x 5/32\" needle USE WITH INSULIN 4 TIMES DAILY      bisoproloL-hydrochlorothiazide (Ziac) 10-6.25 mg tablet Take 1 tablet by mouth once daily. 90 tablet 1    dulaglutide (Trulicity) 1.5 mg/0.5 mL pen injector injection Inject 1.5 mg under the skin 1 (one) time per week. 6 mL 3    FreeStyle Lucero 2 Sensor kit Inject under the skin if needed.      furosemide (Lasix) 20 mg tablet Take 1 tablet (20 mg) by mouth once daily. 90 tablet 1    insulin aspart (NovoLOG) 100 unit/mL (3 mL) pen Use with meals up to 40 units a day 40 mL 3    Lantus Solostar U-100 Insulin 100 unit/mL (3 mL) pen INJECT 66 UNIT Daily      levothyroxine (Synthroid, Levoxyl) 112 mcg tablet Take 1 tablet (112 mcg) by mouth once daily. 90 tablet 1    losartan (Cozaar) 50 mg tablet Take 1 tablet (50 mg) by mouth once daily. 90 tablet 1    metFORMIN (Glucophage) 1,000 mg tablet Take 1 tablet (1,000 mg) by mouth 2 times a day. 180 tablet 3    propranolol (Inderal) 10 mg tablet Take 2 tablets (20 mg) by mouth 2 times a day. 180 tablet 1    simvastatin (Zocor) 10 mg tablet Take 1 tablet (10 mg) by mouth once daily. 90 tablet 1     No current facility-administered medications for this visit.     Allergies   Allergen Reactions    Penicillins Unknown    Sulfamethoxazole Other     Past medical, surgical, family and social history in the chart was reviewed and is accurate including any additions to what is in this HPI.    REVIEW OF SYSTEMS (ROS):   Constitutional: denies any unintentional weight loss or change in " strength.  Integumentary: denies any rashes or pruritus.  Eyes: denies any double vision or eye pain.  Ear/Nose/Mouth/Throat: denies any nosebleeds or gum bleeds.  Cardiovascular: denies any chest pain or syncope.  Respiratory: denies hemoptysis.  Gastrointestinal: denies nausea or vomiting.  Musculoskeletal: denies muscle cramping or weakness.  Neurologic: denies convulsions or seizures.  Hematologic/Lymphatic: denies bleeding tendencies.  Endocrine: denies heat/cold intolerance.  All other systems have been reviewed and are negative unless otherwise noted in the HPI.     OBJECTIVE:  There were no vitals taken for this visit.  PHYSICAL EXAM:  Exam limited 2/2 telehealth visit.   Constitutional: No obvious distress.  Eyes: Non-injected conjunctiva, sclera clear, EOMI.  Ears/Nose/Mouth/Throat: No obvious drainage per ears or nose.  Cardiovascular: No visible edema, cyanosis or pallor of face or upper extremities.  Respiratory: No audible wheezing/stridor; respirations do not appear labored.  Gastrointestinal: UNABLE TO ADEQUATELY ASSESS VIA VIDEO FORMAT.  Musculoskeletal: Grossly normal ROM or UE’s.  Skin: No obvious rashes or open sores.  Neurologic: Alert and oriented, CN 2-12 grossly intact.  Psychiatric: Answers questions appropriately with normal affect.  Hematologic/Lymphatic/Immunologic: No obvious bruises or sites of spontaneous bleeding on visible skin.  Genitourinary:  UNABLE TO ADEQUATELY ASSESS VIA VIDEO FORMAT.    LABS & IMAGING:  Lab Results   Component Value Date    WBC 8.0 02/05/2024    HGB 13.0 02/05/2024    HCT 40.4 02/05/2024     02/05/2024    CHOL 124 02/05/2024    TRIG 151 (H) 02/05/2024    HDL 43.9 02/05/2024    ALT 18 02/05/2024    AST 20 02/05/2024     02/05/2024    K 4.6 02/05/2024     02/05/2024    CREATININE 1.02 02/05/2024    BUN 32 (H) 02/05/2024    CO2 30 02/05/2024    TSH 2.50 02/05/2024    INR 1.0 02/05/2024    HGBA1C 5.9 (H) 02/05/2024     Scribed for Dr. Bustamante  Roxy by Norbert Hernandez.  I, Dr. Robby Ramsey have personally reviewed and agreed with the information entered by the Virtual Scribe. 03/29/24.

## 2024-04-01 ENCOUNTER — HOSPITAL ENCOUNTER (OUTPATIENT)
Dept: RADIOLOGY | Facility: CLINIC | Age: 69
Discharge: HOME | End: 2024-04-01
Payer: MEDICARE

## 2024-04-01 DIAGNOSIS — N20.0 NEPHROLITHIASIS: ICD-10-CM

## 2024-04-01 PROCEDURE — 74018 RADEX ABDOMEN 1 VIEW: CPT

## 2024-04-01 PROCEDURE — 74018 RADEX ABDOMEN 1 VIEW: CPT | Performed by: RADIOLOGY

## 2024-04-04 ENCOUNTER — DOCUMENTATION (OUTPATIENT)
Dept: UROLOGY | Facility: CLINIC | Age: 69
End: 2024-04-04
Payer: MEDICARE

## 2024-04-04 NOTE — PROGRESS NOTES
Pt communicated in my chart   Woke up this morning with pain under left rib, radiating to my back.  Now is primarily back pain.  Nausea and headache most of the day.    Wondering if this is related to kidney stones.    Had x-ray this morning.      Nurse follow up , pt does not have symptoms anymore stopped same day. Discussed symptoms also could be  heart related , if they come back encouraged pt to go to urgent care to be assessed.   Pt will get a clinic apt to go over KUB and POC on Monday.  Dr Ramsey to be alerted

## 2024-04-08 ENCOUNTER — TELEMEDICINE (OUTPATIENT)
Dept: UROLOGY | Facility: CLINIC | Age: 69
End: 2024-04-08
Payer: MEDICARE

## 2024-04-08 DIAGNOSIS — N20.0 NEPHROLITHIASIS: Primary | ICD-10-CM

## 2024-04-08 PROCEDURE — 3061F NEG MICROALBUMINURIA REV: CPT | Performed by: STUDENT IN AN ORGANIZED HEALTH CARE EDUCATION/TRAINING PROGRAM

## 2024-04-08 PROCEDURE — 4010F ACE/ARB THERAPY RXD/TAKEN: CPT | Performed by: STUDENT IN AN ORGANIZED HEALTH CARE EDUCATION/TRAINING PROGRAM

## 2024-04-08 PROCEDURE — 1159F MED LIST DOCD IN RCRD: CPT | Performed by: STUDENT IN AN ORGANIZED HEALTH CARE EDUCATION/TRAINING PROGRAM

## 2024-04-08 PROCEDURE — 3044F HG A1C LEVEL LT 7.0%: CPT | Performed by: STUDENT IN AN ORGANIZED HEALTH CARE EDUCATION/TRAINING PROGRAM

## 2024-04-08 PROCEDURE — 3048F LDL-C <100 MG/DL: CPT | Performed by: STUDENT IN AN ORGANIZED HEALTH CARE EDUCATION/TRAINING PROGRAM

## 2024-04-08 PROCEDURE — 99214 OFFICE O/P EST MOD 30 MIN: CPT | Performed by: STUDENT IN AN ORGANIZED HEALTH CARE EDUCATION/TRAINING PROGRAM

## 2024-04-08 PROCEDURE — 1157F ADVNC CARE PLAN IN RCRD: CPT | Performed by: STUDENT IN AN ORGANIZED HEALTH CARE EDUCATION/TRAINING PROGRAM

## 2024-04-08 PROCEDURE — 1160F RVW MEDS BY RX/DR IN RCRD: CPT | Performed by: STUDENT IN AN ORGANIZED HEALTH CARE EDUCATION/TRAINING PROGRAM

## 2024-04-08 NOTE — PROGRESS NOTES
Scribed for Dr. Robby Ramsey by Norbert Hernandez. I, Dr. Robby Ramsey have personally reviewed and agreed with the information entered by the Virtual Scribe. 04/08/24.    This visit was completed via telemedicine. All issues as below were discussed and addressed but no physical exam was performed unless allowed by visual confirmation. If it was felt that the patient should be evaluated in clinic, then they were directed there. Patient verbal consented to the visit.    ASSESSMENT:  Problem List Items Addressed This Visit       Nephrolithiasis - Primary    Relevant Orders    CT abdomen pelvis wo IV contrast      PLAN:  Discussed treatment options for her non-obstructing right renal stone. [RUP; 9 mm]  She would like to pursue removal with surgical intervention.  Based on CT results will determine definitive course of treatment.  Discussed options including ESWL vs URS, risks discussed.     She plans to go to the [facility] later this week. (Wed)  Discussed that she should be okay.   Her R sided stone appears stable and unlikely to migrate.   Patient reassured, albeit still reviewed potential risks.   After returning from vacation;    Elects to proceed with CT stone to assess stone characteristics and position.   RTC following CT to review results and revisit surgical options.     All questions were answered to the patient’s satisfaction.  Patient agrees with the plan and wishes to proceed.  Continue follow-up for ongoing care of her chronic medical conditions.       History of Present Illness (HPI):  Anna presents virtually as a new patient for an evaluation.  The patient’s EMR has been reviewed.  Lives in [place], [place] with her .     Referred by Dr. Waite (PCP) for history of nephrolithiasis.     Recently underwent a abdominal (liver) ultrasound for her SERNA. (Feb 2024)  Noted a non-obstructing RUP stone (9 mm).   Reviewed her radiographic imaging extending back to 2021.  CT A&P (07/08/21) showed a  non-obstructing RUP stone (6-7 mm) at that time.     TODAY: (04/08/24)  Presents today for follow up and KUB results.   Recent KUB (04/01/24) results reviewed with patient.   Her right-sided stone is not definitely clear, there are two calcification in the area of the right kidney  Appears non-obstructing, however in part obscured by bowel gas pattern.     She plans to go to the [facility] later this week. (Wed)  Discussed that she should be okay.   Stone appears stable and unlikely to migrate.   Patient reassured, albeit still reviewed potential risks.     Reports 1x episode of flank discomfort last week.   However, occurred on the LEFT side.   Resolved spontaneously and has not recurred.   No definite stones visible on the left per KUB.   Denies any recent infections, gross hematuria, fevers or chills.     TO REVIEW: Initial visit (03/29/24)  Reports that she has been doing well overall.   Radiographic imaging results shared with patient.   Currently asymptomatic with no stone-related discomfort.   Denies passing any previous stones in the past.   Denies any stone-related surgeries.    She frequently travels, at times internationally.   Has an upcoming trip scheduled to [facility].  She is concerned that her stone may pass during one of her upcoming trips.   To avoid this, she would like to undergo a procedure to have her stone removed.     Denotes history of Vasiliy's in the setting of vaginal atrophy.   Continues follow up with her PCP for management.     PMH: HTN, T2DM, hypothyroidism.  PSH: Hysterectomy, thyroidectomy, appendectomy, tonsillectomy.   FH: Ovarian cancer (Mother).   SH: Non-smoker.     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/19/2023    Disease of thyroid gland      "Hypertension     Personal history of other diseases of the digestive system     History of appendicitis    Personal history of other endocrine, nutritional and metabolic disease     History of diabetic retinopathy    Postprocedural hypothyroidism     H/O thyroidectomy    Right lower quadrant pain 07/08/2021    Acute right lower quadrant pain    Type 2 diabetes mellitus without complications (CMS/HCC)     Diabetes mellitus, stable    Varicella     Visual impairment      Past Surgical History:   Procedure Laterality Date    APPENDECTOMY  2022    BREAST BIOPSY      HYSTERECTOMY      JOINT REPLACEMENT      OTHER SURGICAL HISTORY  07/16/2021    Hysterectomy    OTHER SURGICAL HISTORY  07/16/2021    Appendectomy    OTHER SURGICAL HISTORY  07/16/2021    Thyroidectomy    OTHER SURGICAL HISTORY  04/25/2022    YAG laser capsulotomy    OTHER SURGICAL HISTORY  04/25/2022    Cataract surgery    OTHER SURGICAL HISTORY  01/06/2022    Knee replacement    OTHER SURGICAL HISTORY  01/06/2022    Tonsillectomy    TONSILLECTOMY      WISDOM TOOTH EXTRACTION       Family History   Problem Relation Name Age of Onset    Hypertension Mother Alee Hung     Ovarian cancer Mother Alee Hung     Cancer Mother Alee Hung     Hypertension Father Narayan Hung     Arthritis Father Narayan Hung     Cancer Father Narayan Hung     Diabetes Father Narayan Hung      Social History     Tobacco Use   Smoking Status Never   Smokeless Tobacco Never     Current Outpatient Medications   Medication Sig Dispense Refill    BD Sarah 2nd Gen Pen Needle 32 gauge x 5/32\" needle USE WITH INSULIN 4 TIMES DAILY      bisoproloL-hydrochlorothiazide (Ziac) 10-6.25 mg tablet Take 1 tablet by mouth once daily. 90 tablet 1    dulaglutide (Trulicity) 1.5 mg/0.5 mL pen injector injection Inject 1.5 mg under the skin 1 (one) time per week. 6 mL 3    FreeStyle Lucero 2 Sensor kit Inject under the skin if needed.      furosemide (Lasix) 20 mg tablet Take 1 tablet (20 mg) by " mouth once daily. 90 tablet 1    insulin aspart (NovoLOG) 100 unit/mL (3 mL) pen Use with meals up to 40 units a day 40 mL 3    Lantus Solostar U-100 Insulin 100 unit/mL (3 mL) pen INJECT 66 UNIT Daily      levothyroxine (Synthroid, Levoxyl) 112 mcg tablet Take 1 tablet (112 mcg) by mouth once daily. 90 tablet 1    losartan (Cozaar) 50 mg tablet Take 1 tablet (50 mg) by mouth once daily. 90 tablet 1    metFORMIN (Glucophage) 1,000 mg tablet Take 1 tablet (1,000 mg) by mouth 2 times a day. 180 tablet 3    propranolol (Inderal) 10 mg tablet Take 2 tablets (20 mg) by mouth 2 times a day. 180 tablet 1    simvastatin (Zocor) 10 mg tablet Take 1 tablet (10 mg) by mouth once daily. 90 tablet 1     No current facility-administered medications for this visit.     Allergies   Allergen Reactions    Penicillins Unknown    Sulfamethoxazole Other     Past medical, surgical, family and social history in the chart was reviewed and is accurate including any additions to what is in this HPI.    REVIEW OF SYSTEMS (ROS):   Constitutional: denies any unintentional weight loss or change in strength.  Integumentary: denies any rashes or pruritus.  Eyes: denies any double vision or eye pain.  Ear/Nose/Mouth/Throat: denies any nosebleeds or gum bleeds.  Cardiovascular: denies any chest pain or syncope.  Respiratory: denies hemoptysis.  Gastrointestinal: denies nausea or vomiting.  Musculoskeletal: denies muscle cramping or weakness.  Neurologic: denies convulsions or seizures.  Hematologic/Lymphatic: denies bleeding tendencies.  Endocrine: denies heat/cold intolerance.  All other systems have been reviewed and are negative unless otherwise noted in the HPI.     OBJECTIVE:  There were no vitals taken for this visit.  PHYSICAL EXAM:  Exam limited 2/2 telehealth visit.   Constitutional: No obvious distress.  Eyes: Non-injected conjunctiva, sclera clear, EOMI.  Ears/Nose/Mouth/Throat: No obvious drainage per ears or nose.  Cardiovascular: No  visible edema, cyanosis or pallor of face or upper extremities.  Respiratory: No audible wheezing/stridor; respirations do not appear labored.  Gastrointestinal: UNABLE TO ADEQUATELY ASSESS VIA VIDEO FORMAT.  Musculoskeletal: Grossly normal ROM or UE’s.  Skin: No obvious rashes or open sores.  Neurologic: Alert and oriented, CN 2-12 grossly intact.  Psychiatric: Answers questions appropriately with normal affect.  Hematologic/Lymphatic/Immunologic: No obvious bruises or sites of spontaneous bleeding on visible skin.  Genitourinary:  UNABLE TO ADEQUATELY ASSESS VIA VIDEO FORMAT.    LABS & IMAGING:  Lab Results   Component Value Date    WBC 8.0 02/05/2024    HGB 13.0 02/05/2024    HCT 40.4 02/05/2024     02/05/2024    CHOL 124 02/05/2024    TRIG 151 (H) 02/05/2024    HDL 43.9 02/05/2024    ALT 18 02/05/2024    AST 20 02/05/2024     02/05/2024    K 4.6 02/05/2024     02/05/2024    CREATININE 1.02 02/05/2024    BUN 32 (H) 02/05/2024    CO2 30 02/05/2024    TSH 2.50 02/05/2024    INR 1.0 02/05/2024    HGBA1C 5.9 (H) 02/05/2024     Scribed for Dr. Robby Ramsey by Norbert Hernandez.  I, Dr. Robby Ramsey have personally reviewed and agreed with the information entered by the Virtual Scribe. 04/08/24.

## 2024-04-23 ENCOUNTER — HOSPITAL ENCOUNTER (OUTPATIENT)
Dept: RADIOLOGY | Facility: CLINIC | Age: 69
Discharge: HOME | End: 2024-04-23
Payer: MEDICARE

## 2024-04-23 DIAGNOSIS — N20.0 NEPHROLITHIASIS: ICD-10-CM

## 2024-04-23 PROCEDURE — 74176 CT ABD & PELVIS W/O CONTRAST: CPT

## 2024-04-23 PROCEDURE — 74176 CT ABD & PELVIS W/O CONTRAST: CPT | Performed by: RADIOLOGY

## 2024-04-29 DIAGNOSIS — Z79.4 TYPE 2 DIABETES MELLITUS WITH STAGE 3A CHRONIC KIDNEY DISEASE, WITH LONG-TERM CURRENT USE OF INSULIN (MULTI): ICD-10-CM

## 2024-04-29 DIAGNOSIS — N18.31 TYPE 2 DIABETES MELLITUS WITH STAGE 3A CHRONIC KIDNEY DISEASE, WITH LONG-TERM CURRENT USE OF INSULIN (MULTI): ICD-10-CM

## 2024-04-29 DIAGNOSIS — E11.22 TYPE 2 DIABETES MELLITUS WITH STAGE 3A CHRONIC KIDNEY DISEASE, WITH LONG-TERM CURRENT USE OF INSULIN (MULTI): ICD-10-CM

## 2024-04-29 RX ORDER — DULAGLUTIDE 1.5 MG/.5ML
1.5 INJECTION, SOLUTION SUBCUTANEOUS
Qty: 6 ML | Refills: 3 | Status: SHIPPED | OUTPATIENT
Start: 2024-05-05 | End: 2025-05-05

## 2024-05-03 ENCOUNTER — LAB (OUTPATIENT)
Dept: LAB | Facility: LAB | Age: 69
End: 2024-05-03
Payer: MEDICARE

## 2024-05-03 DIAGNOSIS — N18.31 TYPE 2 DIABETES MELLITUS WITH STAGE 3A CHRONIC KIDNEY DISEASE, WITH LONG-TERM CURRENT USE OF INSULIN (MULTI): ICD-10-CM

## 2024-05-03 DIAGNOSIS — Z79.4 TYPE 2 DIABETES MELLITUS WITH STAGE 3A CHRONIC KIDNEY DISEASE, WITH LONG-TERM CURRENT USE OF INSULIN (MULTI): ICD-10-CM

## 2024-05-03 DIAGNOSIS — E11.22 TYPE 2 DIABETES MELLITUS WITH STAGE 3A CHRONIC KIDNEY DISEASE, WITH LONG-TERM CURRENT USE OF INSULIN (MULTI): ICD-10-CM

## 2024-05-03 DIAGNOSIS — K74.60 UNSPECIFIED CIRRHOSIS OF LIVER (MULTI): Primary | ICD-10-CM

## 2024-05-03 DIAGNOSIS — K74.60 UNSPECIFIED CIRRHOSIS OF LIVER (MULTI): ICD-10-CM

## 2024-05-03 LAB
ALBUMIN SERPL BCP-MCNC: 4 G/DL (ref 3.4–5)
ALP SERPL-CCNC: 47 U/L (ref 33–136)
ALT SERPL W P-5'-P-CCNC: 17 U/L (ref 7–45)
ANION GAP SERPL CALC-SCNC: 13 MMOL/L (ref 10–20)
AST SERPL W P-5'-P-CCNC: 19 U/L (ref 9–39)
BASOPHILS # BLD AUTO: 0.05 X10*3/UL (ref 0–0.1)
BASOPHILS NFR BLD AUTO: 0.7 %
BILIRUB DIRECT SERPL-MCNC: 0.1 MG/DL (ref 0–0.3)
BILIRUB SERPL-MCNC: 0.5 MG/DL (ref 0–1.2)
BUN SERPL-MCNC: 21 MG/DL (ref 6–23)
CALCIUM SERPL-MCNC: 9.3 MG/DL (ref 8.6–10.6)
CHLORIDE SERPL-SCNC: 103 MMOL/L (ref 98–107)
CO2 SERPL-SCNC: 29 MMOL/L (ref 21–32)
CREAT SERPL-MCNC: 0.8 MG/DL (ref 0.5–1.05)
EGFRCR SERPLBLD CKD-EPI 2021: 80 ML/MIN/1.73M*2
EOSINOPHIL # BLD AUTO: 0.15 X10*3/UL (ref 0–0.7)
EOSINOPHIL NFR BLD AUTO: 2.2 %
ERYTHROCYTE [DISTWIDTH] IN BLOOD BY AUTOMATED COUNT: 13.4 % (ref 11.5–14.5)
GLUCOSE SERPL-MCNC: 104 MG/DL (ref 74–99)
HCT VFR BLD AUTO: 36 % (ref 36–46)
HGB BLD-MCNC: 11.5 G/DL (ref 12–16)
IMM GRANULOCYTES # BLD AUTO: 0.01 X10*3/UL (ref 0–0.7)
IMM GRANULOCYTES NFR BLD AUTO: 0.1 % (ref 0–0.9)
INR PPP: 1 (ref 0.9–1.1)
LYMPHOCYTES # BLD AUTO: 1.78 X10*3/UL (ref 1.2–4.8)
LYMPHOCYTES NFR BLD AUTO: 26.2 %
MCH RBC QN AUTO: 28.6 PG (ref 26–34)
MCHC RBC AUTO-ENTMCNC: 31.9 G/DL (ref 32–36)
MCV RBC AUTO: 90 FL (ref 80–100)
MONOCYTES # BLD AUTO: 0.44 X10*3/UL (ref 0.1–1)
MONOCYTES NFR BLD AUTO: 6.5 %
NEUTROPHILS # BLD AUTO: 4.37 X10*3/UL (ref 1.2–7.7)
NEUTROPHILS NFR BLD AUTO: 64.3 %
NRBC BLD-RTO: 0 /100 WBCS (ref 0–0)
PLATELET # BLD AUTO: 198 X10*3/UL (ref 150–450)
POTASSIUM SERPL-SCNC: 4.4 MMOL/L (ref 3.5–5.3)
PROT SERPL-MCNC: 6.2 G/DL (ref 6.4–8.2)
PROTHROMBIN TIME: 11.3 SECONDS (ref 9.8–12.8)
RBC # BLD AUTO: 4.02 X10*6/UL (ref 4–5.2)
SODIUM SERPL-SCNC: 141 MMOL/L (ref 136–145)
WBC # BLD AUTO: 6.8 X10*3/UL (ref 4.4–11.3)

## 2024-05-03 PROCEDURE — 85610 PROTHROMBIN TIME: CPT

## 2024-05-03 PROCEDURE — 85025 COMPLETE CBC W/AUTO DIFF WBC: CPT

## 2024-05-03 PROCEDURE — 82248 BILIRUBIN DIRECT: CPT

## 2024-05-03 PROCEDURE — 80053 COMPREHEN METABOLIC PANEL: CPT

## 2024-05-03 PROCEDURE — 36415 COLL VENOUS BLD VENIPUNCTURE: CPT

## 2024-05-09 DIAGNOSIS — E78.5 HYPERLIPIDEMIA, UNSPECIFIED HYPERLIPIDEMIA TYPE: ICD-10-CM

## 2024-05-09 DIAGNOSIS — I10 HYPERTENSION, UNSPECIFIED TYPE: ICD-10-CM

## 2024-05-09 RX ORDER — SIMVASTATIN 10 MG/1
10 TABLET, FILM COATED ORAL DAILY
Qty: 90 TABLET | Refills: 1 | Status: SHIPPED | OUTPATIENT
Start: 2024-05-09

## 2024-05-09 RX ORDER — BISOPROLOL FUMARATE AND HYDROCHLOROTHIAZIDE 10; 6.25 MG/1; MG/1
1 TABLET ORAL DAILY
Qty: 90 TABLET | Refills: 1 | Status: SHIPPED | OUTPATIENT
Start: 2024-05-09

## 2024-05-14 ENCOUNTER — OFFICE VISIT (OUTPATIENT)
Dept: GASTROENTEROLOGY | Facility: CLINIC | Age: 69
End: 2024-05-14
Payer: MEDICARE

## 2024-05-14 VITALS
HEART RATE: 69 BPM | WEIGHT: 178 LBS | SYSTOLIC BLOOD PRESSURE: 162 MMHG | DIASTOLIC BLOOD PRESSURE: 82 MMHG | BODY MASS INDEX: 30.39 KG/M2 | OXYGEN SATURATION: 98 % | HEIGHT: 64 IN

## 2024-05-14 DIAGNOSIS — K74.60 CIRRHOSIS, NONALCOHOLIC (MULTI): ICD-10-CM

## 2024-05-14 PROCEDURE — 99214 OFFICE O/P EST MOD 30 MIN: CPT | Performed by: INTERNAL MEDICINE

## 2024-05-14 PROCEDURE — 3044F HG A1C LEVEL LT 7.0%: CPT | Performed by: INTERNAL MEDICINE

## 2024-05-14 PROCEDURE — 3079F DIAST BP 80-89 MM HG: CPT | Performed by: INTERNAL MEDICINE

## 2024-05-14 PROCEDURE — 3077F SYST BP >= 140 MM HG: CPT | Performed by: INTERNAL MEDICINE

## 2024-05-14 PROCEDURE — 1159F MED LIST DOCD IN RCRD: CPT | Performed by: INTERNAL MEDICINE

## 2024-05-14 PROCEDURE — 4010F ACE/ARB THERAPY RXD/TAKEN: CPT | Performed by: INTERNAL MEDICINE

## 2024-05-14 PROCEDURE — 1036F TOBACCO NON-USER: CPT | Performed by: INTERNAL MEDICINE

## 2024-05-14 PROCEDURE — 3048F LDL-C <100 MG/DL: CPT | Performed by: INTERNAL MEDICINE

## 2024-05-14 PROCEDURE — 1157F ADVNC CARE PLAN IN RCRD: CPT | Performed by: INTERNAL MEDICINE

## 2024-05-14 PROCEDURE — 3061F NEG MICROALBUMINURIA REV: CPT | Performed by: INTERNAL MEDICINE

## 2024-05-14 PROCEDURE — 1160F RVW MEDS BY RX/DR IN RCRD: CPT | Performed by: INTERNAL MEDICINE

## 2024-05-14 PROCEDURE — 1126F AMNT PAIN NOTED NONE PRSNT: CPT | Performed by: INTERNAL MEDICINE

## 2024-05-14 RX ORDER — BISMUTH SUBSALICYLATE 262 MG
1 TABLET,CHEWABLE ORAL DAILY
COMMUNITY

## 2024-05-14 ASSESSMENT — PAIN SCALES - GENERAL: PAINLEVEL: 0-NO PAIN

## 2024-05-14 ASSESSMENT — ENCOUNTER SYMPTOMS: DEPRESSION: 0

## 2024-05-14 NOTE — PATIENT INSTRUCTIONS
Get labs in 3, 6 and 9 months.    Get an ultrasound in 6 months.    Continue to work on diet, weight loss and exercise.    See me back in clinic in about 9 months.

## 2024-05-14 NOTE — PROGRESS NOTES
HEPATOLOGY RETURN PATIENT VISIT    May 14, 2024    Dr. Jese Waite       Patient Name:   HAASE, BARBARA  Medical Record Number: [id number]  YOB: 1955    Dear Dr. Waite,    I had the pleasure of seeing Barbara Haase for follow-up evaluation in the [facility] ([facility] office). History and physical examination was performed. Pertinent available laboratory, imaging, pathology results were reviewed.     History of Present Illness:   The patient is a 68 year old white female who is referred for follow-up evaluation of cirrhosis, probably due to SERNA.    The patient had an acute appendicitis and went to the operating room on 7/8/2021. The surgeon noted intraoperatively that the liver appeared cirrhotic. Unfortunately, the surgeon did not do a liver biopsy at the time of surgery.  Because of the cirrhosis, she was referred to me for further evaluation.    Prior to July 2021, she had never had any known liver disease.    The patient has risk factors for fatty liver disease including diabetes, hyperlipidemia, and being overweight.  She is on therapy for diabetes.  She is on a statin.  She is overweight but her weight has been stable for many years.    The patient denied any past history of acute hepatitis or jaundice.      She has never had any manifestations of liver disease including no jaundice, ascites, hepatic encephalopathy, or variceal bleeding. She denied ever having a liver biopsy.    She initially saw me in consultation 8/20/2021.  At that time, I did additional evaluation (see results below).    She did undergo right total knee replacement 10/28/2022.  She had no evidence of hepatic decompensation.  She was having significant pain after the surgery and was intermittently using acetaminophen and/or NSAIDs.    She was seen by one of the general surgeons on 3/13/2024 to check if she should get a prophylactic cholecystectomy.  She was noted to have gallstones but has  never had symptoms from them.  She was concerned because she travels and wanted to know if she should have her gallbladder out prophylactically to avoid cholecystitis while traveling internationally.  The recommendation was not to offer surgery.  For some reason they told her to come see me to get another opinion about this.  This is realistically a surgical issue, not a medical issue.    She presented today for follow-up evaluation.  She denied any specific liver-related complaints.  She has lost about 6 pounds since she last saw us.  She reports that her other providers have been making changes in her diabetes medications.    Past Medical/Surgical History:     Acute perforated appendicitis (540.0) (K35.32)    S/p laparoscopic appendectomy 7/8/2021, [facility]    History of Acute right lower quadrant pain (789.03,338.19) (R10.31)    Decreased appetite (783.0) (R63.0)    Nausea in adult (787.02) (R11.0)    Right lower quadrant abdominal tenderness with rebound tenderness (789.63)    History of Acute right lower quadrant pain (789.03,338.19) (R10.31)    Resolved Date: 16 Jul 2021    History of Diabetes mellitus, stable (250.00) (E11.9)    History of H/O thyroidectomy (246.8) (E89.0)    History of H/O: hysterectomy (V88.01) (Z90.710)    History of appendicitis (V12.79) (Z87.19)    History of hypertension (V12.59) (Z86.79)    History of Appendectomy    History of Hysterectomy    History of Thyroidectomy    Osteopenia      Family History:   There is no known family history of liver disease.  There is no known family history of colon cancer.    Social History:   She denied tobacco use. She rarely uses alcohol. She denied intravenous drug use.  She denied tattoos.  She denied blood transfusions.    Review of systems: As noted above.  Her right knee pain is better.  She is not currently taking any acetaminophen or NSAIDs.  She has lost about 6 pounds over the last year.  No fever, chills, visual changes, auditory changes,  shortness of breath, chest pain, abdominal pain, GI bleeding, diarrhea, constipation, depression, dysuria, hematuria, or rash.       Medical, Surgical, Family, and Social Histories  Past Medical History:   Diagnosis Date    Acquired absence of both cervix and uterus     H/O: hysterectomy    Acute cystitis with hematuria 04/19/2023    Arthritis     Cataract     COVID-19 virus infection 04/19/2023    Diabetes mellitus with kidney disease (Multi) 04/19/2023    Disease of thyroid gland     Hypertension     Personal history of other diseases of the digestive system     History of appendicitis    Personal history of other endocrine, nutritional and metabolic disease     History of diabetic retinopathy    Postprocedural hypothyroidism     H/O thyroidectomy    Right lower quadrant pain 07/08/2021    Acute right lower quadrant pain    Type 2 diabetes mellitus without complications (Multi)     Diabetes mellitus, stable    Varicella     Visual impairment        Past Surgical History:   Procedure Laterality Date    APPENDECTOMY  2022    BREAST BIOPSY      HYSTERECTOMY      JOINT REPLACEMENT      OTHER SURGICAL HISTORY  07/16/2021    Hysterectomy    OTHER SURGICAL HISTORY  07/16/2021    Appendectomy    OTHER SURGICAL HISTORY  07/16/2021    Thyroidectomy    OTHER SURGICAL HISTORY  04/25/2022    YAG laser capsulotomy    OTHER SURGICAL HISTORY  04/25/2022    Cataract surgery    OTHER SURGICAL HISTORY  01/06/2022    Knee replacement    OTHER SURGICAL HISTORY  01/06/2022    Tonsillectomy    TONSILLECTOMY      WISDOM TOOTH EXTRACTION         Family History   Problem Relation Name Age of Onset    Hypertension Mother Te[place]Moak [place]     Ovarian cancer Mother Te-Moak Abhilash     Cancer Mother Te-Moak Abhilash     Hypertension Father Narayan Abhilash     Arthritis Father Narayan [place]     Cancer Father Narayan [place]     Diabetes Father Narayan [place]        Social History     Socioeconomic History    Marital status:      Spouse name: Not on file     "Number of children: Not on file    Years of education: Not on file    Highest education level: Not on file   Occupational History    Not on file   Tobacco Use    Smoking status: Never    Smokeless tobacco: Never   Substance and Sexual Activity    Alcohol use: Not Currently    Drug use: Never    Sexual activity: Yes     Partners: Male     Birth control/protection: Post-menopausal   Other Topics Concern    Not on file   Social History Narrative    Not on file     Social Determinants of Health     Financial Resource Strain: Not on file   Food Insecurity: Not on file   Transportation Needs: Not on file   Physical Activity: Not on file   Stress: Not on file   Social Connections: Not on file   Intimate Partner Violence: Not on file   Housing Stability: Not on file       Allergies and Current Medications  Allergies   Allergen Reactions    Penicillins Unknown    Sulfamethoxazole Other     Current Outpatient Medications   Medication Sig Dispense Refill    BD Sarah 2nd Gen Pen Needle 32 gauge x 5/32\" needle USE WITH INSULIN 4 TIMES DAILY      bisoproloL-hydrochlorothiazide (Ziac) 10-6.25 mg tablet Take 1 tablet by mouth once daily. 90 tablet 1    calcium carbonate/vitamin D3 (CALCIUM 600 + D,3, ORAL) Take 1 tablet by mouth 2 times a day.      dulaglutide (Trulicity) 1.5 mg/0.5 mL pen injector injection Inject 1.5 mg under the skin 1 (one) time per week. 6 mL 3    FreeStyle Lucero 2 Sensor kit Inject under the skin if needed.      insulin aspart (NovoLOG) 100 unit/mL (3 mL) pen Use with meals up to 40 units a day 40 mL 3    Lantus Solostar U-100 Insulin 100 unit/mL (3 mL) pen Inject 37 Units under the skin once daily.      levothyroxine (Synthroid, Levoxyl) 112 mcg tablet Take 1 tablet (112 mcg) by mouth once daily. 90 tablet 1    losartan (Cozaar) 50 mg tablet Take 1 tablet (50 mg) by mouth once daily. 90 tablet 1    metFORMIN (Glucophage) 1,000 mg tablet Take 1 tablet (1,000 mg) by mouth 2 times a day. 180 tablet 3    " "multivitamin tablet Take 1 tablet by mouth once daily.      propranolol (Inderal) 10 mg tablet Take 2 tablets (20 mg) by mouth 2 times a day. (Patient taking differently: Take 1 tablet (10 mg) by mouth 2 times a day.) 180 tablet 1    simvastatin (Zocor) 10 mg tablet Take 1 tablet (10 mg) by mouth once daily. 90 tablet 1     No current facility-administered medications for this visit.        Physical Exam  /82 (BP Location: Left arm, Patient Position: Sitting, BP Cuff Size: Adult long)   Pulse 69   Ht 1.626 m (5' 4\")   Wt 80.7 kg (178 lb)   SpO2 98%   BMI 30.55 kg/m²       Physical Examination:   General Appearance: alert and in no acute distress.   HEENT: oropharynx without lesions. Anicteric sclerae.   Neck supple, nontender, without adenopathy, thyromegaly, or JVD.   Lungs clear to auscultation and percussion.   Heart RRR without murmurs, rubs, or gallops.   Abdomen: Soft, nontender, bowel sounds positive, without obvious ascites. Liver felt about 4 cm below the right costal margin and spleen not palpable.  She is mildly overweight centrally.  Extremities full ROM, no atrophy, normal strength. No edema.   Skin no specific lesions.   Neurological exam nonfocal, alert and oriented.  No asterixis  No spider angiomata, but she does have mild bilateral palmar erythema.     Labs 5/3/2024 INR 1, WBC 6.8, hemoglobin 11.5, platelets 198, glucose 104, sodium 141, creatinine 0.8, protein 6.2, albumin 4, alk phos 47, bilirubin 0.5, AST 19, ALT 17.    Labs 2/5/2024 AFP < 4, cholesterol 124, triglycerides 151, hemoglobin A1c 5.9%.    Labs 6/1/2023 WBC 7.4, hemoglobin 11.7, platelets 208, AFP < 4, hemoglobin A1c 6.5%, glucose 109, sodium 141, creatinine 0.89, protein 6.9, albumin 4.3, alk phos 51, bilirubin 0.6, AST 18, ALT 16, INR 1.    Labs 10/26/2022 protein 7.2, albumin 4.4, alk phos 56, bilirubin 0.6, AST 20, ALT 19, glucose 116, sodium 142, creatinine 1.01, AFP < 4, INR 1, WBC 7.1, hemoglobin 12.6, platelets " 218.    Labs 9/21/2022 hemoglobin A1c 6.1%.    Labs 8/1/2022 hepatitis A antibody positive, hepatitis B surface antibody positive.    Labs 3/22/2022 WBC 7.3, hemoglobin 12.7, platelets 255, glucose 76, sodium 144, creatinine 0.99, protein 7.4, albumin 4.6, alk phos 49, bilirubin 0.5, AST 17, ALT 18, cholesterol 158, LDL 73, triglycerides 198, hemoglobin A1c 6%.    Labs 3/2/2022 INR 1, AFP < 4.    Labs 12/17/2021 AST 21, ALT 20, alk phos 58, bilirubin 0.6.    Labs 8/20/2021 ferritin 40, hepatitis A antibody negative, hepatitis B surface antigen negative, hepatitis B surface antibody negative, hepatitis C antibody negative, iron saturation 17%, ceruloplasmin 30, antinuclear antibody negative, smooth muscle antibody negative, antimitochondrial antibody negative, alpha-1 antitrypsin phenotype MM.    Labs 7/10/2021 glucose 198, sodium 136, creatinine 1.07, WBC 9.2, hemoglobin 10.5, platelets 180.    Labs 7/8/2021 protein 7.7, albumin 4.2, alk phos 55, bilirubin 1.2, AST 12, ALT 17.    CAT scan without contrast 4/23/2024:  IMPRESSION:  Minimal pericardial effusion. Cholelithiasis. Nonobstructing 8.5 mm  calculus in the upper pole of the right kidney without  hydronephrosis. Additional chronic findings as above.    Ultrasound 2/5/2024:  IMPRESSION:  Echogenic fatty infiltrated liver. No definite focal liver lesions.      Cholelithiasis.      Suboptimal visualization of the pancreas.      Nephrolithiasis.      Ultrasound 6/1/2023:  IMPRESSION:  Diffuse hepatocellular disease changes may represent a combination of  cirrhosis and fatty infiltration. Right hepatic lobe 7 mm ovoid  hypoechoic lesion likely is a small cyst. No additional focal hepatic  lesion demonstrated.  Gallbladder contains some layering echogenic sludge. No shadowing  gallstones or wall thickening. No biliary dilation.  Nonspecific coarsened appearance of the pancreatic parenchyma as well  as dilation of the main pancreatic duct measuring 5 mm in  diameter.  Pancreatic visualization is overall limited.    Ultrasound 10/26/2022:  IMPRESSION:  Enlarged right hepatic lobe with overall appearance suggestive of  some combination of steatosis and/or chronic hepatocellular disease.  There is nothing suspicious for hepatocellular carcinoma.    Ultrasound 3/2/2022 revealed coarsened echotexture of the liver with nodularity consistent with cirrhosis, sludge in the gallbladder.    CT with contrast 7/8/2021:  IMPRESSION:  Acute uncomplicated appendicitis. Surgical evaluation is recommended.     Hepatomegaly. Micronodular appearance of the hepatic surface is  suspicious for cirrhosis. Correlate clinically and with LFTs;  consider histopathologic correlation as indicated. Nonspecific  subcentimeter hypodensities in the liver are too small to  characterize.     Cholelithiasis.     Nonobstructing right upper pole renal calculus.    Pancreas revealed atrophic pancreas with mild diffuse dilatation of the pancreatic duct with no focal lesion.     Additional findings as discussed above.       Colonoscopy 10/5/2021 revealed hemorrhoids, perianal skin tags, single nonbleeding colonic angio ectasia, a 5 mm polyp in the ascending colon, 2 diminutive polyps in the distal transverse colon, a 5 mm polyp in the distal transverse colon.  Pathology showed tubular adenoma and sessile serrated adenoma.    EGD 10/5/2021 revealed normal esophagus, gastritis, duodenitis.    FibroScan 8/16/2023 revealed a median liver stiffness of 8.1 kPa with IQR 11% and .        Assessment/Plan:     Cirrhosis, probably due to SERNA  FibroScan in 8/2023 showing stage 2 fibrosis and severe steatosis    The patient is referred to me for follow-up evaluation of cirrhosis.    The patient was noted by the surgeon on 7/8/2021 to have what appeared to be a cirrhotic liver during appendectomy. Unfortunately, they did not do a liver biopsy. For now, I will assume that the patient does have cirrhosis and treat her  accordingly.    The most likely etiology would be SERNA cirrhosis.    We again spoke about fatty liver disease. I explained that the risk factors include diabetes, obesity, hyperlipidemia and alcohol use. I explained that she needs to work on diet, weight loss and exercise.  She also needs to work aggressively with the primary provider about hyperlipidemia and diabetes.  I did explain that 20-25% of patients with SERNA may proceed to cirrhosis.    Her other serologies were unrevealing.    She is now immune to hepatitis A and hepatitis B.    As a cirrhotic patient, she would be at risk for esophageal varices.  EGD in October 2021 did not reveal varices.  We had previously discussed the idea of an EGD in 2 to 3 years (October 2023 or October 2024).  However, given the fact that she is so well compensated, I opted to start with a FibroScan and decide on the need for EGD based on the liver stiffness.  In fact, her FibroScan in August 2023 did not show significant liver stiffness.  For now, we can hold off on EGD and repeat the FibroSCAN around August 2025.     Her hemoglobin has been relatively stable.  She denied any overt evidence of bleeding.  She did have some small polyps.  She will be due for a repeat 5-year colonoscopy in October 2026.  Even if the FibroScan does not show significant fibrosis in 2025, I would likely do a simultaneous EGD given the fact that we would already be sedating her for the colonoscopy in 2026.    As a cirrhotic patient, she would be at risk for hepatocellular carcinoma and should get ultrasound and alpha-fetoprotein every 6 months.    Based on her labs and overall condition, she seems to be a very well compensated cirrhotic.  She can get labs every 3 months and see me back in about 9 months.    She was asking whether she should undergo prophylactic cholecystectomy because she does travel to [place].  I told her that there is no guidelines suggesting prophylactic  cholecystectomy in a patient who is asymptomatic.  I also reminded her that she would be at increased risk to undergo surgery given her underlying liver disease.    Thank you for allowing me to participate in the care of this patient. Please feel free to contact me with any questions regarding their care.     Sincerely,     Tin Baltazar MD, FAASLD, FACG.   Medical Director, Hepatology  [facility]  [facility]  Professor of Medicine  Division of Gastroenterology and Liver Disease  [facility] [employer]  [address] [place] [id number]  Phone: [phone]  Fax: [phone].      This document was generated with a computerized dictation system.  Because of this, there could be errors in grammar and/or content.

## 2024-05-20 DIAGNOSIS — E06.3 HYPOTHYROIDISM DUE TO HASHIMOTO'S THYROIDITIS: ICD-10-CM

## 2024-05-20 DIAGNOSIS — E03.8 HYPOTHYROIDISM DUE TO HASHIMOTO'S THYROIDITIS: ICD-10-CM

## 2024-05-20 RX ORDER — LEVOTHYROXINE SODIUM 112 UG/1
112 TABLET ORAL DAILY
Qty: 90 TABLET | Refills: 1 | Status: SHIPPED | OUTPATIENT
Start: 2024-05-20

## 2024-05-20 NOTE — TELEPHONE ENCOUNTER
Next OV 8/12/24   Requested Prescriptions     Pending Prescriptions Disp Refills    levothyroxine (Synthroid, Levoxyl) 112 mcg tablet 90 tablet 1     Sig: Take 1 tablet (112 mcg) by mouth once daily.

## 2024-06-07 ENCOUNTER — APPOINTMENT (OUTPATIENT)
Dept: ENDOCRINOLOGY | Facility: CLINIC | Age: 69
End: 2024-06-07
Payer: MEDICARE

## 2024-06-20 ENCOUNTER — APPOINTMENT (OUTPATIENT)
Dept: UROLOGY | Facility: CLINIC | Age: 69
End: 2024-06-20
Payer: MEDICARE

## 2024-06-21 ENCOUNTER — TELEPHONE (OUTPATIENT)
Dept: PRIMARY CARE | Facility: CLINIC | Age: 69
End: 2024-06-21

## 2024-06-21 ENCOUNTER — APPOINTMENT (OUTPATIENT)
Dept: ENDOCRINOLOGY | Facility: CLINIC | Age: 69
End: 2024-06-21
Payer: MEDICARE

## 2024-06-21 VITALS
SYSTOLIC BLOOD PRESSURE: 128 MMHG | BODY MASS INDEX: 30.66 KG/M2 | WEIGHT: 179.6 LBS | HEART RATE: 80 BPM | DIASTOLIC BLOOD PRESSURE: 70 MMHG | HEIGHT: 64 IN | RESPIRATION RATE: 16 BRPM

## 2024-06-21 DIAGNOSIS — I10 PRIMARY HYPERTENSION: ICD-10-CM

## 2024-06-21 DIAGNOSIS — Z79.4 TYPE 2 DIABETES MELLITUS WITH STAGE 3A CHRONIC KIDNEY DISEASE, WITH LONG-TERM CURRENT USE OF INSULIN (MULTI): Primary | ICD-10-CM

## 2024-06-21 DIAGNOSIS — N18.31 TYPE 2 DIABETES MELLITUS WITH STAGE 3A CHRONIC KIDNEY DISEASE, WITH LONG-TERM CURRENT USE OF INSULIN (MULTI): Primary | ICD-10-CM

## 2024-06-21 DIAGNOSIS — E11.22 TYPE 2 DIABETES MELLITUS WITH STAGE 3A CHRONIC KIDNEY DISEASE, WITH LONG-TERM CURRENT USE OF INSULIN (MULTI): Primary | ICD-10-CM

## 2024-06-21 DIAGNOSIS — E03.8 HYPOTHYROIDISM DUE TO HASHIMOTO'S THYROIDITIS: ICD-10-CM

## 2024-06-21 DIAGNOSIS — E78.5 HYPERLIPIDEMIA, UNSPECIFIED HYPERLIPIDEMIA TYPE: ICD-10-CM

## 2024-06-21 DIAGNOSIS — E06.3 HYPOTHYROIDISM DUE TO HASHIMOTO'S THYROIDITIS: ICD-10-CM

## 2024-06-21 PROCEDURE — 3061F NEG MICROALBUMINURIA REV: CPT | Performed by: INTERNAL MEDICINE

## 2024-06-21 PROCEDURE — 3078F DIAST BP <80 MM HG: CPT | Performed by: INTERNAL MEDICINE

## 2024-06-21 PROCEDURE — 1157F ADVNC CARE PLAN IN RCRD: CPT | Performed by: INTERNAL MEDICINE

## 2024-06-21 PROCEDURE — 1159F MED LIST DOCD IN RCRD: CPT | Performed by: INTERNAL MEDICINE

## 2024-06-21 PROCEDURE — 99214 OFFICE O/P EST MOD 30 MIN: CPT | Performed by: INTERNAL MEDICINE

## 2024-06-21 PROCEDURE — 1036F TOBACCO NON-USER: CPT | Performed by: INTERNAL MEDICINE

## 2024-06-21 PROCEDURE — 1160F RVW MEDS BY RX/DR IN RCRD: CPT | Performed by: INTERNAL MEDICINE

## 2024-06-21 PROCEDURE — 3044F HG A1C LEVEL LT 7.0%: CPT | Performed by: INTERNAL MEDICINE

## 2024-06-21 PROCEDURE — 3048F LDL-C <100 MG/DL: CPT | Performed by: INTERNAL MEDICINE

## 2024-06-21 PROCEDURE — 3074F SYST BP LT 130 MM HG: CPT | Performed by: INTERNAL MEDICINE

## 2024-06-21 PROCEDURE — 4010F ACE/ARB THERAPY RXD/TAKEN: CPT | Performed by: INTERNAL MEDICINE

## 2024-06-21 RX ORDER — FUROSEMIDE 20 MG/1
TABLET ORAL
COMMUNITY

## 2024-06-21 ASSESSMENT — ENCOUNTER SYMPTOMS
HEADACHES: 0
PALPITATIONS: 0
CHILLS: 0
NAUSEA: 0
COUGH: 0
FEVER: 0
FATIGUE: 0
SHORTNESS OF BREATH: 0
DIARRHEA: 0
VOMITING: 0

## 2024-06-21 NOTE — PROGRESS NOTES
Endocrinology: Follow up visit  Subjective   Patient ID: Barbara A Haase is a 68 y.o. female who presents for Diabetes (Type 2 ), Hypothyroidism, Hyperlipidemia, and Hypertension.    PCP: Jese Waite MD    HPI  Since last visit continues to do well with sugars.  Forgot willie today but states numbers are great and only rare lows since we have cut back insulin  37 basal meal up to 35 units  Having some issues with trulciity stocking but has it right now    Checks sugars 4x a day  Injects insulin 4x a day  Currently utilizing cgm to check sugars      Review of Systems   Constitutional:  Negative for chills, fatigue and fever.   Respiratory:  Negative for cough and shortness of breath.    Cardiovascular:  Negative for chest pain and palpitations.   Gastrointestinal:  Negative for diarrhea, nausea and vomiting.   Neurological:  Negative for headaches.       Patient Active Problem List   Diagnosis    Abnormal mammogram    Astigmatism    Hyperopia    Myopia with presbyopia    Cirrhosis, nonalcoholic (Multi)    Decreased appetite    Diabetes mellitus, type 2 (Multi)    History of YAG laser capsulotomy of lens    Hyperlipidemia    Hypertension    Hypothyroidism    Mild nonproliferative diabetic retinopathy of both eyes without macular edema (Multi)    Primary osteoarthritis of right knee    Nausea in adult    Stage 3 chronic kidney disease (Multi)    Pseudophakia    Right lower quadrant abdominal tenderness with rebound tenderness    Tremor    Tubular adenoma of colon    Stiffness of right knee, not elsewhere classified    Acute non-recurrent maxillary sinusitis    Calcification of left breast    Routine general medical examination at health care facility    Type 2 diabetes mellitus without complications (Multi)    Visit for screening mammogram    Nephrolithiasis    Calculus of gallbladder without cholecystitis without obstruction    Infective urethritis    Atrophic vaginitis    Hyperopia, bilateral    Presbyopia     "    Home Meds:  Current Outpatient Medications   Medication Instructions    BD Sarah 2nd Gen Pen Needle 32 gauge x 5/32\" needle USE WITH INSULIN 4 TIMES DAILY    bisoproloL-hydrochlorothiazide (Ziac) 10-6.25 mg tablet 1 tablet, oral, Daily    calcium carbonate/vitamin D3 (CALCIUM 600 + D,3, ORAL) 1 tablet, oral, 2 times daily    FreeStyle Lucero 2 Sensor kit subcutaneous, As needed    furosemide (Lasix) 20 mg tablet oral    insulin aspart (NovoLOG) 100 unit/mL (3 mL) pen Use with meals up to 40 units a day    Lantus Solostar U-100 Insulin 100 unit/mL (3 mL) pen Inject 37 Units under the skin once daily.    levothyroxine (SYNTHROID, LEVOXYL) 112 mcg, oral, Daily    losartan (COZAAR) 50 mg, oral, Daily    metFORMIN (GLUCOPHAGE) 1,000 mg, oral, 2 times daily    multivitamin tablet 1 tablet, oral, Daily    propranolol (INDERAL) 20 mg, oral, 2 times daily    simvastatin (ZOCOR) 10 mg, oral, Daily    Trulicity 1.5 mg, subcutaneous, Once Weekly        Allergies   Allergen Reactions    Penicillins Unknown    Sulfamethoxazole Other        Objective   Vitals:    06/21/24 1333   BP: 128/70   Pulse: 80   Resp: 16      Vitals:    06/21/24 1333   Weight: 81.5 kg (179 lb 9.6 oz)      Body mass index is 30.83 kg/m².   Physical Exam  Constitutional:       Appearance: Normal appearance. She is overweight.   HENT:      Head: Normocephalic and atraumatic.   Neck:      Thyroid: No thyroid mass, thyromegaly or thyroid tenderness.   Cardiovascular:      Rate and Rhythm: Normal rate and regular rhythm.      Heart sounds: No murmur heard.     No gallop.   Pulmonary:      Effort: Pulmonary effort is normal.      Breath sounds: Normal breath sounds.   Abdominal:      Palpations: Abdomen is soft.      Comments: benign   Neurological:      General: No focal deficit present.      Mental Status: She is alert and oriented to person, place, and time.      Deep Tendon Reflexes: Reflexes are normal and symmetric.   Psychiatric:         Behavior: " "Behavior is cooperative.         Labs:  Lab Results   Component Value Date    HGBA1C 5.9 (H) 02/05/2024    TSH 2.50 02/05/2024      No results found for: \"PR1\", \"THYROIDPAB\", \"TSI\"     Assessment/Plan   Problem List Items Addressed This Visit       Diabetes mellitus, type 2 (Multi) - Primary    Relevant Orders    Comprehensive Metabolic Panel    Hemoglobin A1C    Hyperlipidemia    Hypertension    Hypothyroidism     Dm2:  Discussed trulicity stocking  Numbers by report excellent  Labs when able  Htn:  Bp excellent  Lipids:  Last time check excellent  Hypo:  Tsh last time normal  Follow upin 4 months    Electronically signed by:  Jhoana Malik MD 06/21/24 1:55 PM              "

## 2024-07-15 DIAGNOSIS — Z79.4 TYPE 2 DIABETES MELLITUS WITH STAGE 3A CHRONIC KIDNEY DISEASE, WITH LONG-TERM CURRENT USE OF INSULIN (MULTI): ICD-10-CM

## 2024-07-15 DIAGNOSIS — E11.22 TYPE 2 DIABETES MELLITUS WITH STAGE 3A CHRONIC KIDNEY DISEASE, WITH LONG-TERM CURRENT USE OF INSULIN (MULTI): ICD-10-CM

## 2024-07-15 DIAGNOSIS — N18.31 TYPE 2 DIABETES MELLITUS WITH STAGE 3A CHRONIC KIDNEY DISEASE, WITH LONG-TERM CURRENT USE OF INSULIN (MULTI): ICD-10-CM

## 2024-07-15 RX ORDER — INSULIN GLARGINE 100 [IU]/ML
37 INJECTION, SOLUTION SUBCUTANEOUS DAILY
Qty: 33.3 ML | Refills: 3 | Status: SHIPPED | OUTPATIENT
Start: 2024-07-15 | End: 2025-07-15

## 2024-07-15 RX ORDER — DULAGLUTIDE 1.5 MG/.5ML
1.5 INJECTION, SOLUTION SUBCUTANEOUS
Qty: 6 ML | Refills: 3 | Status: SHIPPED | OUTPATIENT
Start: 2024-07-21 | End: 2025-07-21

## 2024-08-08 ENCOUNTER — LAB (OUTPATIENT)
Dept: LAB | Facility: LAB | Age: 69
End: 2024-08-08
Payer: MEDICARE

## 2024-08-08 DIAGNOSIS — N18.31 TYPE 2 DIABETES MELLITUS WITH STAGE 3A CHRONIC KIDNEY DISEASE, WITH LONG-TERM CURRENT USE OF INSULIN (MULTI): ICD-10-CM

## 2024-08-08 DIAGNOSIS — E11.22 TYPE 2 DIABETES MELLITUS WITH STAGE 3A CHRONIC KIDNEY DISEASE, WITH LONG-TERM CURRENT USE OF INSULIN (MULTI): ICD-10-CM

## 2024-08-08 DIAGNOSIS — Z79.4 TYPE 2 DIABETES MELLITUS WITH STAGE 3A CHRONIC KIDNEY DISEASE, WITH LONG-TERM CURRENT USE OF INSULIN (MULTI): ICD-10-CM

## 2024-08-08 DIAGNOSIS — K74.60 CIRRHOSIS, NONALCOHOLIC (MULTI): ICD-10-CM

## 2024-08-08 LAB
AFP SERPL-MCNC: <4 NG/ML (ref 0–9)
ALBUMIN SERPL BCP-MCNC: 4.3 G/DL (ref 3.4–5)
ALP SERPL-CCNC: 50 U/L (ref 33–136)
ALT SERPL W P-5'-P-CCNC: 17 U/L (ref 7–45)
ANION GAP SERPL CALC-SCNC: 14 MMOL/L (ref 10–20)
AST SERPL W P-5'-P-CCNC: 19 U/L (ref 9–39)
BASOPHILS # BLD AUTO: 0.07 X10*3/UL (ref 0–0.1)
BASOPHILS NFR BLD AUTO: 1 %
BILIRUB DIRECT SERPL-MCNC: 0.1 MG/DL (ref 0–0.3)
BILIRUB SERPL-MCNC: 0.6 MG/DL (ref 0–1.2)
BUN SERPL-MCNC: 25 MG/DL (ref 6–23)
CALCIUM SERPL-MCNC: 9.3 MG/DL (ref 8.6–10.6)
CHLORIDE SERPL-SCNC: 102 MMOL/L (ref 98–107)
CO2 SERPL-SCNC: 29 MMOL/L (ref 21–32)
CREAT SERPL-MCNC: 0.85 MG/DL (ref 0.5–1.05)
EGFRCR SERPLBLD CKD-EPI 2021: 74 ML/MIN/1.73M*2
EOSINOPHIL # BLD AUTO: 0.19 X10*3/UL (ref 0–0.7)
EOSINOPHIL NFR BLD AUTO: 2.7 %
ERYTHROCYTE [DISTWIDTH] IN BLOOD BY AUTOMATED COUNT: 13.6 % (ref 11.5–14.5)
EST. AVERAGE GLUCOSE BLD GHB EST-MCNC: 131 MG/DL
GLUCOSE SERPL-MCNC: 117 MG/DL (ref 74–99)
HBA1C MFR BLD: 6.2 %
HCT VFR BLD AUTO: 38.5 % (ref 36–46)
HGB BLD-MCNC: 12.3 G/DL (ref 12–16)
IMM GRANULOCYTES # BLD AUTO: 0.02 X10*3/UL (ref 0–0.7)
IMM GRANULOCYTES NFR BLD AUTO: 0.3 % (ref 0–0.9)
INR PPP: 1 (ref 0.9–1.1)
LYMPHOCYTES # BLD AUTO: 1.8 X10*3/UL (ref 1.2–4.8)
LYMPHOCYTES NFR BLD AUTO: 25.2 %
MCH RBC QN AUTO: 29.3 PG (ref 26–34)
MCHC RBC AUTO-ENTMCNC: 31.9 G/DL (ref 32–36)
MCV RBC AUTO: 92 FL (ref 80–100)
MONOCYTES # BLD AUTO: 0.49 X10*3/UL (ref 0.1–1)
MONOCYTES NFR BLD AUTO: 6.9 %
NEUTROPHILS # BLD AUTO: 4.58 X10*3/UL (ref 1.2–7.7)
NEUTROPHILS NFR BLD AUTO: 63.9 %
NRBC BLD-RTO: 0 /100 WBCS (ref 0–0)
PLATELET # BLD AUTO: 197 X10*3/UL (ref 150–450)
POTASSIUM SERPL-SCNC: 4.3 MMOL/L (ref 3.5–5.3)
PROT SERPL-MCNC: 7 G/DL (ref 6.4–8.2)
PROTHROMBIN TIME: 11.2 SECONDS (ref 9.8–12.8)
RBC # BLD AUTO: 4.2 X10*6/UL (ref 4–5.2)
SODIUM SERPL-SCNC: 141 MMOL/L (ref 136–145)
WBC # BLD AUTO: 7.2 X10*3/UL (ref 4.4–11.3)

## 2024-08-08 PROCEDURE — 36415 COLL VENOUS BLD VENIPUNCTURE: CPT

## 2024-08-08 PROCEDURE — 82248 BILIRUBIN DIRECT: CPT

## 2024-08-08 PROCEDURE — 85610 PROTHROMBIN TIME: CPT

## 2024-08-08 PROCEDURE — 83036 HEMOGLOBIN GLYCOSYLATED A1C: CPT

## 2024-08-08 PROCEDURE — 85025 COMPLETE CBC W/AUTO DIFF WBC: CPT

## 2024-08-08 PROCEDURE — 82105 ALPHA-FETOPROTEIN SERUM: CPT

## 2024-08-08 PROCEDURE — 80053 COMPREHEN METABOLIC PANEL: CPT

## 2024-08-12 ENCOUNTER — APPOINTMENT (OUTPATIENT)
Dept: SURGERY | Facility: CLINIC | Age: 69
End: 2024-08-12
Payer: MEDICARE

## 2024-08-20 ENCOUNTER — APPOINTMENT (OUTPATIENT)
Dept: PRIMARY CARE | Facility: CLINIC | Age: 69
End: 2024-08-20
Payer: MEDICARE

## 2024-08-20 VITALS
HEIGHT: 64 IN | HEART RATE: 66 BPM | WEIGHT: 181 LBS | DIASTOLIC BLOOD PRESSURE: 84 MMHG | BODY MASS INDEX: 30.9 KG/M2 | OXYGEN SATURATION: 96 % | SYSTOLIC BLOOD PRESSURE: 140 MMHG

## 2024-08-20 DIAGNOSIS — I10 HYPERTENSION, UNSPECIFIED TYPE: ICD-10-CM

## 2024-08-20 DIAGNOSIS — E78.5 HYPERLIPIDEMIA, UNSPECIFIED HYPERLIPIDEMIA TYPE: ICD-10-CM

## 2024-08-20 DIAGNOSIS — Z79.4 TYPE 2 DIABETES MELLITUS WITH STAGE 3A CHRONIC KIDNEY DISEASE, WITH LONG-TERM CURRENT USE OF INSULIN (MULTI): ICD-10-CM

## 2024-08-20 DIAGNOSIS — N18.31 TYPE 2 DIABETES MELLITUS WITH STAGE 3A CHRONIC KIDNEY DISEASE, WITH LONG-TERM CURRENT USE OF INSULIN (MULTI): ICD-10-CM

## 2024-08-20 DIAGNOSIS — Z00.00 ROUTINE GENERAL MEDICAL EXAMINATION AT HEALTH CARE FACILITY: Primary | ICD-10-CM

## 2024-08-20 DIAGNOSIS — I10 PRIMARY HYPERTENSION: ICD-10-CM

## 2024-08-20 DIAGNOSIS — E11.22 TYPE 2 DIABETES MELLITUS WITH STAGE 3A CHRONIC KIDNEY DISEASE, WITH LONG-TERM CURRENT USE OF INSULIN (MULTI): ICD-10-CM

## 2024-08-20 PROCEDURE — 99214 OFFICE O/P EST MOD 30 MIN: CPT | Performed by: FAMILY MEDICINE

## 2024-08-20 PROCEDURE — 3079F DIAST BP 80-89 MM HG: CPT | Performed by: FAMILY MEDICINE

## 2024-08-20 PROCEDURE — 1159F MED LIST DOCD IN RCRD: CPT | Performed by: FAMILY MEDICINE

## 2024-08-20 PROCEDURE — 3044F HG A1C LEVEL LT 7.0%: CPT | Performed by: FAMILY MEDICINE

## 2024-08-20 PROCEDURE — 1158F ADVNC CARE PLAN TLK DOCD: CPT | Performed by: FAMILY MEDICINE

## 2024-08-20 PROCEDURE — G0439 PPPS, SUBSEQ VISIT: HCPCS | Performed by: FAMILY MEDICINE

## 2024-08-20 PROCEDURE — 1160F RVW MEDS BY RX/DR IN RCRD: CPT | Performed by: FAMILY MEDICINE

## 2024-08-20 PROCEDURE — 1123F ACP DISCUSS/DSCN MKR DOCD: CPT | Performed by: FAMILY MEDICINE

## 2024-08-20 PROCEDURE — 4010F ACE/ARB THERAPY RXD/TAKEN: CPT | Performed by: FAMILY MEDICINE

## 2024-08-20 PROCEDURE — 1157F ADVNC CARE PLAN IN RCRD: CPT | Performed by: FAMILY MEDICINE

## 2024-08-20 PROCEDURE — 3061F NEG MICROALBUMINURIA REV: CPT | Performed by: FAMILY MEDICINE

## 2024-08-20 PROCEDURE — 3008F BODY MASS INDEX DOCD: CPT | Performed by: FAMILY MEDICINE

## 2024-08-20 PROCEDURE — 3048F LDL-C <100 MG/DL: CPT | Performed by: FAMILY MEDICINE

## 2024-08-20 PROCEDURE — 1170F FXNL STATUS ASSESSED: CPT | Performed by: FAMILY MEDICINE

## 2024-08-20 PROCEDURE — 1036F TOBACCO NON-USER: CPT | Performed by: FAMILY MEDICINE

## 2024-08-20 PROCEDURE — 3077F SYST BP >= 140 MM HG: CPT | Performed by: FAMILY MEDICINE

## 2024-08-20 RX ORDER — LOSARTAN POTASSIUM 50 MG/1
50 TABLET ORAL DAILY
Qty: 90 TABLET | Refills: 1 | Status: SHIPPED | OUTPATIENT
Start: 2024-08-20

## 2024-08-20 RX ORDER — FUROSEMIDE 20 MG/1
20 TABLET ORAL DAILY
Qty: 90 TABLET | Refills: 1 | Status: SHIPPED | OUTPATIENT
Start: 2024-08-20

## 2024-08-20 ASSESSMENT — ACTIVITIES OF DAILY LIVING (ADL)
DRESSING: INDEPENDENT
BATHING: INDEPENDENT
MANAGING_FINANCES: INDEPENDENT
TAKING_MEDICATION: INDEPENDENT
DOING_HOUSEWORK: INDEPENDENT
GROCERY_SHOPPING: INDEPENDENT

## 2024-08-20 ASSESSMENT — ENCOUNTER SYMPTOMS
LOSS OF SENSATION IN FEET: 1
DEPRESSION: 0
OCCASIONAL FEELINGS OF UNSTEADINESS: 0

## 2024-08-20 NOTE — PROGRESS NOTES
"Subjective   Reason for Visit: Barbara A Haase is an 69 y.o. female here for a Medicare Wellness visit.       HPI  Patient is here for follow-up and for Medicare wellness blood pressure controlled  Seeing endocrinology A1c well controlled  Blood pressure controlled  NO SOB  Tolerating statin  Stopped lasix. Noticed change in urine stream Now taking lasix every other day.   Recent labs normal K and Kidney function.     Patient Care Team:  Jese Waite MD as PCP - General     Review of Systems   Constitutional:  Negative for chills and fever.   HENT: Negative.     Respiratory: Negative.     Cardiovascular: Negative.    Gastrointestinal: Negative.  Negative for nausea and vomiting.   Endocrine: Negative.    Genitourinary: Negative.    Musculoskeletal: Negative.    Skin: Negative.  Negative for rash.   Allergic/Immunologic: Negative.    Neurological: Negative.    Hematological: Negative.    Psychiatric/Behavioral: Negative.     All other systems reviewed and are negative.      Objective   Vitals:  /84   Pulse 66   Ht 1.626 m (5' 4\")   Wt 82.1 kg (181 lb)   SpO2 96%   BMI 31.07 kg/m²       Physical Exam  Vitals and nursing note reviewed.   Constitutional:       Appearance: Normal appearance.   HENT:      Head: Normocephalic and atraumatic.      Right Ear: Tympanic membrane normal.      Left Ear: Tympanic membrane normal.      Nose: Nose normal.      Mouth/Throat:      Mouth: Mucous membranes are moist.   Eyes:      Pupils: Pupils are equal, round, and reactive to light.   Cardiovascular:      Rate and Rhythm: Normal rate and regular rhythm.      Pulses: Normal pulses.      Heart sounds: Normal heart sounds.   Pulmonary:      Effort: Pulmonary effort is normal.      Breath sounds: Normal breath sounds.   Abdominal:      General: Abdomen is flat. Bowel sounds are normal.      Palpations: Abdomen is soft.   Musculoskeletal:         General: Normal range of motion.      Cervical back: Normal range of motion and " neck supple.   Skin:     General: Skin is warm and dry.      Capillary Refill: Capillary refill takes less than 2 seconds.   Neurological:      General: No focal deficit present.      Mental Status: She is alert and oriented to person, place, and time.   Psychiatric:         Mood and Affect: Mood normal.         Assessment/Plan   Problem List Items Addressed This Visit       Diabetes mellitus, type 2 (Multi)    Current Assessment & Plan     Continue follow-up with endocrinology last A1c 6.2         Relevant Orders    CT cardiac scoring wo IV contrast    Hyperlipidemia    Current Assessment & Plan     Check lipid panel continue medications continue healthy eating work out  150 minutes a week          Relevant Orders    CT cardiac scoring wo IV contrast    Hypertension    Current Assessment & Plan     Patient's blood pressure is at goal of 130/85 or less. Condition is stable. Continue current medications and treatment plan.  I recommend that you exercise for 30-45 minutes 5 days a week.  I also recommend a balanced diet with fruits and vegetables every day, lean meats, and little fried foods. The DASH diet (you can find this online) is a good example of this.          Relevant Medications    furosemide (Lasix) 20 mg tablet    losartan (Cozaar) 50 mg tablet    Other Relevant Orders    CT cardiac scoring wo IV contrast    Routine general medical examination at health care facility - Primary   Follow up in 6 months

## 2024-08-21 ASSESSMENT — ENCOUNTER SYMPTOMS
GASTROINTESTINAL NEGATIVE: 1
VOMITING: 0
ENDOCRINE NEGATIVE: 1
CARDIOVASCULAR NEGATIVE: 1
NEUROLOGICAL NEGATIVE: 1
RESPIRATORY NEGATIVE: 1
CHILLS: 0
MUSCULOSKELETAL NEGATIVE: 1
NAUSEA: 0
PSYCHIATRIC NEGATIVE: 1
FEVER: 0
ALLERGIC/IMMUNOLOGIC NEGATIVE: 1
HEMATOLOGIC/LYMPHATIC NEGATIVE: 1

## 2024-09-16 ENCOUNTER — APPOINTMENT (OUTPATIENT)
Dept: OPHTHALMOLOGY | Facility: CLINIC | Age: 69
End: 2024-09-16
Payer: MEDICARE

## 2024-10-20 DIAGNOSIS — E03.9 HYPOTHYROIDISM, UNSPECIFIED: ICD-10-CM

## 2024-10-20 RX ORDER — FLASH GLUCOSE SENSOR
KIT MISCELLANEOUS
Qty: 6 EACH | Refills: 3 | Status: SHIPPED | OUTPATIENT
Start: 2024-10-20 | End: 2024-10-23 | Stop reason: SDUPTHER

## 2024-10-23 DIAGNOSIS — E03.9 HYPOTHYROIDISM, UNSPECIFIED: ICD-10-CM

## 2024-10-23 RX ORDER — FLASH GLUCOSE SENSOR
KIT MISCELLANEOUS
Qty: 6 EACH | Refills: 3 | Status: SHIPPED | OUTPATIENT
Start: 2024-10-23 | End: 2024-10-23 | Stop reason: SDUPTHER

## 2024-10-23 RX ORDER — FLASH GLUCOSE SENSOR
KIT MISCELLANEOUS
Qty: 6 EACH | Refills: 3 | Status: SHIPPED | OUTPATIENT
Start: 2024-10-23 | End: 2024-10-25 | Stop reason: SDUPTHER

## 2024-10-25 DIAGNOSIS — E03.9 HYPOTHYROIDISM, UNSPECIFIED: ICD-10-CM

## 2024-10-25 DIAGNOSIS — E11.22 TYPE 2 DIABETES MELLITUS WITH STAGE 3A CHRONIC KIDNEY DISEASE, WITH LONG-TERM CURRENT USE OF INSULIN (MULTI): Primary | ICD-10-CM

## 2024-10-25 DIAGNOSIS — Z79.4 TYPE 2 DIABETES MELLITUS WITH STAGE 3A CHRONIC KIDNEY DISEASE, WITH LONG-TERM CURRENT USE OF INSULIN (MULTI): Primary | ICD-10-CM

## 2024-10-25 DIAGNOSIS — N18.31 TYPE 2 DIABETES MELLITUS WITH STAGE 3A CHRONIC KIDNEY DISEASE, WITH LONG-TERM CURRENT USE OF INSULIN (MULTI): Primary | ICD-10-CM

## 2024-10-25 RX ORDER — FLASH GLUCOSE SENSOR
KIT MISCELLANEOUS
Qty: 6 EACH | Refills: 3 | Status: SHIPPED | OUTPATIENT
Start: 2024-10-25

## 2024-10-29 DIAGNOSIS — E06.3 HYPOTHYROIDISM DUE TO HASHIMOTO'S THYROIDITIS: ICD-10-CM

## 2024-10-29 RX ORDER — LEVOTHYROXINE SODIUM 112 UG/1
112 TABLET ORAL DAILY
Qty: 90 TABLET | Refills: 1 | Status: SHIPPED | OUTPATIENT
Start: 2024-10-29

## 2024-11-06 ENCOUNTER — APPOINTMENT (OUTPATIENT)
Dept: OPHTHALMOLOGY | Facility: CLINIC | Age: 69
End: 2024-11-06
Payer: MEDICARE

## 2024-11-14 ENCOUNTER — HOSPITAL ENCOUNTER (OUTPATIENT)
Dept: RADIOLOGY | Facility: CLINIC | Age: 69
Discharge: HOME | End: 2024-11-14
Payer: MEDICARE

## 2024-11-14 ENCOUNTER — LAB (OUTPATIENT)
Dept: LAB | Facility: LAB | Age: 69
End: 2024-11-14
Payer: MEDICARE

## 2024-11-14 DIAGNOSIS — K74.60 CIRRHOSIS, NONALCOHOLIC (MULTI): ICD-10-CM

## 2024-11-14 DIAGNOSIS — Z79.4 TYPE 2 DIABETES MELLITUS WITH STAGE 3A CHRONIC KIDNEY DISEASE, WITH LONG-TERM CURRENT USE OF INSULIN (MULTI): ICD-10-CM

## 2024-11-14 DIAGNOSIS — E11.22 TYPE 2 DIABETES MELLITUS WITH STAGE 3A CHRONIC KIDNEY DISEASE, WITH LONG-TERM CURRENT USE OF INSULIN (MULTI): ICD-10-CM

## 2024-11-14 DIAGNOSIS — N18.31 TYPE 2 DIABETES MELLITUS WITH STAGE 3A CHRONIC KIDNEY DISEASE, WITH LONG-TERM CURRENT USE OF INSULIN (MULTI): ICD-10-CM

## 2024-11-14 LAB
AFP SERPL-MCNC: <4 NG/ML (ref 0–9)
ALBUMIN SERPL BCP-MCNC: 4.3 G/DL (ref 3.4–5)
ALP SERPL-CCNC: 45 U/L (ref 33–136)
ALT SERPL W P-5'-P-CCNC: 18 U/L (ref 7–45)
ANION GAP SERPL CALC-SCNC: 16 MMOL/L (ref 10–20)
AST SERPL W P-5'-P-CCNC: 18 U/L (ref 9–39)
BASOPHILS # BLD AUTO: 0.05 X10*3/UL (ref 0–0.1)
BASOPHILS NFR BLD AUTO: 0.7 %
BILIRUB DIRECT SERPL-MCNC: 0.1 MG/DL (ref 0–0.3)
BILIRUB SERPL-MCNC: 0.6 MG/DL (ref 0–1.2)
BUN SERPL-MCNC: 27 MG/DL (ref 6–23)
CALCIUM SERPL-MCNC: 9.4 MG/DL (ref 8.6–10.6)
CHLORIDE SERPL-SCNC: 103 MMOL/L (ref 98–107)
CO2 SERPL-SCNC: 27 MMOL/L (ref 21–32)
CREAT SERPL-MCNC: 0.96 MG/DL (ref 0.5–1.05)
EGFRCR SERPLBLD CKD-EPI 2021: 64 ML/MIN/1.73M*2
EOSINOPHIL # BLD AUTO: 0.25 X10*3/UL (ref 0–0.7)
EOSINOPHIL NFR BLD AUTO: 3.5 %
ERYTHROCYTE [DISTWIDTH] IN BLOOD BY AUTOMATED COUNT: 13.6 % (ref 11.5–14.5)
EST. AVERAGE GLUCOSE BLD GHB EST-MCNC: 137 MG/DL
GLUCOSE SERPL-MCNC: 135 MG/DL (ref 74–99)
HBA1C MFR BLD: 6.4 %
HCT VFR BLD AUTO: 38.6 % (ref 36–46)
HGB BLD-MCNC: 12.4 G/DL (ref 12–16)
IMM GRANULOCYTES # BLD AUTO: 0.02 X10*3/UL (ref 0–0.7)
IMM GRANULOCYTES NFR BLD AUTO: 0.3 % (ref 0–0.9)
INR PPP: 1 (ref 0.9–1.1)
LYMPHOCYTES # BLD AUTO: 1.59 X10*3/UL (ref 1.2–4.8)
LYMPHOCYTES NFR BLD AUTO: 22.3 %
MCH RBC QN AUTO: 29.5 PG (ref 26–34)
MCHC RBC AUTO-ENTMCNC: 32.1 G/DL (ref 32–36)
MCV RBC AUTO: 92 FL (ref 80–100)
MONOCYTES # BLD AUTO: 0.46 X10*3/UL (ref 0.1–1)
MONOCYTES NFR BLD AUTO: 6.5 %
NEUTROPHILS # BLD AUTO: 4.75 X10*3/UL (ref 1.2–7.7)
NEUTROPHILS NFR BLD AUTO: 66.7 %
NRBC BLD-RTO: 0 /100 WBCS (ref 0–0)
PLATELET # BLD AUTO: 205 X10*3/UL (ref 150–450)
POTASSIUM SERPL-SCNC: 4.2 MMOL/L (ref 3.5–5.3)
PROT SERPL-MCNC: 6.9 G/DL (ref 6.4–8.2)
PROTHROMBIN TIME: 11.4 SECONDS (ref 9.8–12.8)
RBC # BLD AUTO: 4.21 X10*6/UL (ref 4–5.2)
SODIUM SERPL-SCNC: 142 MMOL/L (ref 136–145)
WBC # BLD AUTO: 7.1 X10*3/UL (ref 4.4–11.3)

## 2024-11-14 PROCEDURE — 36415 COLL VENOUS BLD VENIPUNCTURE: CPT

## 2024-11-14 PROCEDURE — 85025 COMPLETE CBC W/AUTO DIFF WBC: CPT

## 2024-11-14 PROCEDURE — 83036 HEMOGLOBIN GLYCOSYLATED A1C: CPT

## 2024-11-14 PROCEDURE — 76705 ECHO EXAM OF ABDOMEN: CPT

## 2024-11-14 PROCEDURE — 82105 ALPHA-FETOPROTEIN SERUM: CPT

## 2024-11-14 PROCEDURE — 80053 COMPREHEN METABOLIC PANEL: CPT

## 2024-11-14 PROCEDURE — 85610 PROTHROMBIN TIME: CPT

## 2024-11-14 PROCEDURE — 82248 BILIRUBIN DIRECT: CPT

## 2024-11-14 PROCEDURE — 76705 ECHO EXAM OF ABDOMEN: CPT | Performed by: STUDENT IN AN ORGANIZED HEALTH CARE EDUCATION/TRAINING PROGRAM

## 2024-11-15 ENCOUNTER — APPOINTMENT (OUTPATIENT)
Dept: ENDOCRINOLOGY | Facility: CLINIC | Age: 69
End: 2024-11-15
Payer: MEDICARE

## 2024-11-15 VITALS
HEIGHT: 64 IN | DIASTOLIC BLOOD PRESSURE: 70 MMHG | HEART RATE: 72 BPM | BODY MASS INDEX: 30.75 KG/M2 | WEIGHT: 180.1 LBS | SYSTOLIC BLOOD PRESSURE: 120 MMHG | RESPIRATION RATE: 16 BRPM

## 2024-11-15 DIAGNOSIS — E11.22 TYPE 2 DIABETES MELLITUS WITH STAGE 3A CHRONIC KIDNEY DISEASE, WITH LONG-TERM CURRENT USE OF INSULIN (MULTI): Primary | ICD-10-CM

## 2024-11-15 DIAGNOSIS — I10 PRIMARY HYPERTENSION: ICD-10-CM

## 2024-11-15 DIAGNOSIS — Z79.4 TYPE 2 DIABETES MELLITUS WITH STAGE 3A CHRONIC KIDNEY DISEASE, WITH LONG-TERM CURRENT USE OF INSULIN (MULTI): Primary | ICD-10-CM

## 2024-11-15 DIAGNOSIS — E78.5 HYPERLIPIDEMIA, UNSPECIFIED HYPERLIPIDEMIA TYPE: ICD-10-CM

## 2024-11-15 DIAGNOSIS — N18.31 TYPE 2 DIABETES MELLITUS WITH STAGE 3A CHRONIC KIDNEY DISEASE, WITH LONG-TERM CURRENT USE OF INSULIN (MULTI): Primary | ICD-10-CM

## 2024-11-15 PROBLEM — R10.823 RIGHT LOWER QUADRANT ABDOMINAL TENDERNESS WITH REBOUND TENDERNESS: Status: RESOLVED | Noted: 2023-04-19 | Resolved: 2024-11-15

## 2024-11-15 PROBLEM — R63.0 DECREASED APPETITE: Status: RESOLVED | Noted: 2023-04-19 | Resolved: 2024-11-15

## 2024-11-15 PROBLEM — J01.00 ACUTE NON-RECURRENT MAXILLARY SINUSITIS: Status: RESOLVED | Noted: 2023-05-14 | Resolved: 2024-11-15

## 2024-11-15 PROBLEM — R11.0 NAUSEA IN ADULT: Status: RESOLVED | Noted: 2023-04-19 | Resolved: 2024-11-15

## 2024-11-15 PROCEDURE — 1160F RVW MEDS BY RX/DR IN RCRD: CPT | Performed by: INTERNAL MEDICINE

## 2024-11-15 PROCEDURE — 1157F ADVNC CARE PLAN IN RCRD: CPT | Performed by: INTERNAL MEDICINE

## 2024-11-15 PROCEDURE — 4010F ACE/ARB THERAPY RXD/TAKEN: CPT | Performed by: INTERNAL MEDICINE

## 2024-11-15 PROCEDURE — 3078F DIAST BP <80 MM HG: CPT | Performed by: INTERNAL MEDICINE

## 2024-11-15 PROCEDURE — 3074F SYST BP LT 130 MM HG: CPT | Performed by: INTERNAL MEDICINE

## 2024-11-15 PROCEDURE — 1123F ACP DISCUSS/DSCN MKR DOCD: CPT | Performed by: INTERNAL MEDICINE

## 2024-11-15 PROCEDURE — 3044F HG A1C LEVEL LT 7.0%: CPT | Performed by: INTERNAL MEDICINE

## 2024-11-15 PROCEDURE — 3008F BODY MASS INDEX DOCD: CPT | Performed by: INTERNAL MEDICINE

## 2024-11-15 PROCEDURE — 3061F NEG MICROALBUMINURIA REV: CPT | Performed by: INTERNAL MEDICINE

## 2024-11-15 PROCEDURE — 1159F MED LIST DOCD IN RCRD: CPT | Performed by: INTERNAL MEDICINE

## 2024-11-15 PROCEDURE — 1036F TOBACCO NON-USER: CPT | Performed by: INTERNAL MEDICINE

## 2024-11-15 PROCEDURE — 99214 OFFICE O/P EST MOD 30 MIN: CPT | Performed by: INTERNAL MEDICINE

## 2024-11-15 PROCEDURE — 3048F LDL-C <100 MG/DL: CPT | Performed by: INTERNAL MEDICINE

## 2024-11-15 PROCEDURE — G2211 COMPLEX E/M VISIT ADD ON: HCPCS | Performed by: INTERNAL MEDICINE

## 2024-11-15 ASSESSMENT — ENCOUNTER SYMPTOMS
COUGH: 0
VOMITING: 0
SHORTNESS OF BREATH: 0
FATIGUE: 0
HEADACHES: 0
FEVER: 0
DIARRHEA: 0
NAUSEA: 0
CHILLS: 0
PALPITATIONS: 0

## 2024-11-15 NOTE — PROGRESS NOTES
"Endocrinology: Follow up visit  Subjective   Patient ID: Barbara A Haase is a 69 y.o. female who presents for Diabetes (Type 2 ), Hypothyroidism, Hyperlipidemia, and Hypertension.    PCP: Jese Waite MD    HPI  Dm2  Mdi  Metformin 2000 every day  Trulicity 1.5    Checks sugars 4x a day  Injects insulin 4x a day  Currently utilizing cgm to check sugars    Since last visit doing great with sugars   A1c excellent at 6.4  Having some lows in am  Doing ok on trulicity    Review of Systems   Constitutional:  Negative for chills, fatigue and fever.   Respiratory:  Negative for cough and shortness of breath.    Cardiovascular:  Negative for chest pain and palpitations.   Gastrointestinal:  Negative for diarrhea, nausea and vomiting.   Neurological:  Negative for headaches.       Patient Active Problem List   Diagnosis    Abnormal mammogram    Astigmatism    Hyperopia    Myopia with presbyopia    Cirrhosis, nonalcoholic (Multi)    Diabetes mellitus, type 2 (Multi)    History of YAG laser capsulotomy of lens    Hyperlipidemia    Hypertension    Hypothyroidism    Mild nonproliferative diabetic retinopathy of both eyes without macular edema    Primary osteoarthritis of right knee    Stage 3 chronic kidney disease (Multi)    Pseudophakia    Tremor    Tubular adenoma of colon    Stiffness of right knee, not elsewhere classified    Calcification of left breast    Routine general medical examination at health care facility    Type 2 diabetes mellitus without complications (Multi)    Visit for screening mammogram    Nephrolithiasis    Calculus of gallbladder without cholecystitis without obstruction    Infective urethritis    Atrophic vaginitis    Hyperopia, bilateral    Presbyopia        Home Meds:  Current Outpatient Medications   Medication Instructions    BD Sarah 2nd Gen Pen Needle 32 gauge x 5/32\" needle USE WITH INSULIN 4 TIMES DAILY    bisoproloL-hydrochlorothiazide (Ziac) 10-6.25 mg tablet 1 tablet, oral, Daily    calcium " "carbonate/vitamin D3 (CALCIUM 600 + D,3, ORAL) 1 tablet, 2 times daily    flash glucose sensor kit (FreeStyle Lucero 2 Sensor) kit Change every 14 days/ ICD10: E11.9 ON INSULIN    furosemide (LASIX) 20 mg, oral, Daily    insulin aspart (NovoLOG) 100 unit/mL (3 mL) pen Use with meals up to 40 units a day    Lantus Solostar U-100 Insulin 37 Units, subcutaneous, Daily, Take as directed per insulin instructions.    levothyroxine (SYNTHROID, LEVOXYL) 112 mcg, oral, Daily    losartan (COZAAR) 50 mg, oral, Daily    metFORMIN (GLUCOPHAGE) 1,000 mg, oral, 2 times daily    multivitamin tablet 1 tablet, Daily    propranolol (INDERAL) 20 mg, oral, 2 times daily    simvastatin (ZOCOR) 10 mg, oral, Daily    Trulicity 1.5 mg, subcutaneous, Once Weekly        Allergies   Allergen Reactions    Penicillins Unknown    Sulfamethoxazole Other        Objective   Vitals:    11/15/24 1030   BP: 120/70   Pulse: 72   Resp: 16      Vitals:    11/15/24 1030   Weight: 81.7 kg (180 lb 1.6 oz)      Body mass index is 30.91 kg/m².   Physical Exam  Constitutional:       Appearance: Normal appearance. She is overweight.   HENT:      Head: Normocephalic and atraumatic.   Neck:      Thyroid: No thyroid mass, thyromegaly or thyroid tenderness.   Cardiovascular:      Rate and Rhythm: Normal rate and regular rhythm.      Heart sounds: No murmur heard.     No gallop.   Pulmonary:      Effort: Pulmonary effort is normal.      Breath sounds: Normal breath sounds.   Abdominal:      Palpations: Abdomen is soft.      Comments: benign   Neurological:      General: No focal deficit present.      Mental Status: She is alert and oriented to person, place, and time.      Deep Tendon Reflexes: Reflexes are normal and symmetric.   Psychiatric:         Behavior: Behavior is cooperative.         Labs:  Lab Results   Component Value Date    HGBA1C 6.4 (H) 11/14/2024    TSH 2.50 02/05/2024      No results found for: \"PR1\", \"THYROIDPAB\", \"TSI\"     Assessment/Plan "   Assessment & Plan  Type 2 diabetes mellitus with stage 3a chronic kidney disease, with long-term current use of insulin (Multi)  Sugars are great  Would reduce lantus to 34 units and add jardiance  Discussed potential side effects and benefits    Orders:    CBC; Future    Comprehensive Metabolic Panel; Future    Lipid Panel; Future    Hemoglobin A1C; Future    Albumin-Creatinine Ratio, Urine Random; Future    empagliflozin (Jardiance) 10 mg; Take 1 tablet (10 mg) by mouth once daily.    Hyperlipidemia, unspecified hyperlipidemia type    Lipids under excellent control  Primary hypertension    Bp excellent      Electronically signed by:  Jhoana Malik MD 11/15/24 10:38 AM

## 2024-11-15 NOTE — ASSESSMENT & PLAN NOTE
Sugars are great  Would reduce lantus to 34 units and add jardiance  Discussed potential side effects and benefits    Orders:    CBC; Future    Comprehensive Metabolic Panel; Future    Lipid Panel; Future    Hemoglobin A1C; Future    Albumin-Creatinine Ratio, Urine Random; Future    empagliflozin (Jardiance) 10 mg; Take 1 tablet (10 mg) by mouth once daily.

## 2024-12-09 ENCOUNTER — HOSPITAL ENCOUNTER (OUTPATIENT)
Dept: RADIOLOGY | Facility: CLINIC | Age: 69
Discharge: HOME | End: 2024-12-09
Payer: MEDICARE

## 2024-12-09 DIAGNOSIS — N18.31 TYPE 2 DIABETES MELLITUS WITH STAGE 3A CHRONIC KIDNEY DISEASE, WITH LONG-TERM CURRENT USE OF INSULIN (MULTI): ICD-10-CM

## 2024-12-09 DIAGNOSIS — Z79.4 TYPE 2 DIABETES MELLITUS WITH STAGE 3A CHRONIC KIDNEY DISEASE, WITH LONG-TERM CURRENT USE OF INSULIN (MULTI): ICD-10-CM

## 2024-12-09 DIAGNOSIS — E11.22 TYPE 2 DIABETES MELLITUS WITH STAGE 3A CHRONIC KIDNEY DISEASE, WITH LONG-TERM CURRENT USE OF INSULIN (MULTI): ICD-10-CM

## 2024-12-09 DIAGNOSIS — I10 PRIMARY HYPERTENSION: ICD-10-CM

## 2024-12-09 DIAGNOSIS — E78.5 HYPERLIPIDEMIA, UNSPECIFIED HYPERLIPIDEMIA TYPE: ICD-10-CM

## 2024-12-09 PROCEDURE — 75571 CT HRT W/O DYE W/CA TEST: CPT

## 2024-12-11 DIAGNOSIS — R93.1 ELEVATED CORONARY ARTERY CALCIUM SCORE: Primary | ICD-10-CM

## 2025-01-02 DIAGNOSIS — E11.9 TYPE 2 DIABETES MELLITUS WITHOUT COMPLICATIONS (MULTI): ICD-10-CM

## 2025-01-02 DIAGNOSIS — E03.9 HYPOTHYROIDISM, UNSPECIFIED: ICD-10-CM

## 2025-01-02 DIAGNOSIS — E11.22 TYPE 2 DIABETES MELLITUS WITH STAGE 3A CHRONIC KIDNEY DISEASE, WITH LONG-TERM CURRENT USE OF INSULIN (MULTI): ICD-10-CM

## 2025-01-02 DIAGNOSIS — Z79.4 TYPE 2 DIABETES MELLITUS WITH STAGE 3A CHRONIC KIDNEY DISEASE, WITH LONG-TERM CURRENT USE OF INSULIN (MULTI): ICD-10-CM

## 2025-01-02 DIAGNOSIS — N18.31 TYPE 2 DIABETES MELLITUS WITH STAGE 3A CHRONIC KIDNEY DISEASE, WITH LONG-TERM CURRENT USE OF INSULIN (MULTI): ICD-10-CM

## 2025-01-02 RX ORDER — FLASH GLUCOSE SENSOR
KIT MISCELLANEOUS
Qty: 6 EACH | Refills: 3 | Status: SHIPPED | OUTPATIENT
Start: 2025-01-02

## 2025-01-02 RX ORDER — METFORMIN HYDROCHLORIDE 1000 MG/1
1000 TABLET ORAL 2 TIMES DAILY
Qty: 180 TABLET | Refills: 3 | Status: SHIPPED | OUTPATIENT
Start: 2025-01-02

## 2025-01-02 RX ORDER — INSULIN GLARGINE 100 [IU]/ML
37 INJECTION, SOLUTION SUBCUTANEOUS DAILY
Qty: 33.3 ML | Refills: 3 | Status: SHIPPED | OUTPATIENT
Start: 2025-01-02 | End: 2026-01-02

## 2025-01-02 RX ORDER — DULAGLUTIDE 1.5 MG/.5ML
1.5 INJECTION, SOLUTION SUBCUTANEOUS
Qty: 6 ML | Refills: 3 | Status: SHIPPED | OUTPATIENT
Start: 2025-01-05 | End: 2026-01-05

## 2025-01-02 RX ORDER — INSULIN ASPART 100 [IU]/ML
INJECTION, SOLUTION INTRAVENOUS; SUBCUTANEOUS
Qty: 40 ML | Refills: 3 | Status: SHIPPED | OUTPATIENT
Start: 2025-01-02

## 2025-01-04 DIAGNOSIS — I10 HYPERTENSION, UNSPECIFIED TYPE: ICD-10-CM

## 2025-01-04 DIAGNOSIS — I10 PRIMARY HYPERTENSION: ICD-10-CM

## 2025-01-04 DIAGNOSIS — E06.3 HYPOTHYROIDISM DUE TO HASHIMOTO'S THYROIDITIS: ICD-10-CM

## 2025-01-04 DIAGNOSIS — E78.5 HYPERLIPIDEMIA, UNSPECIFIED HYPERLIPIDEMIA TYPE: ICD-10-CM

## 2025-01-04 RX ORDER — LEVOTHYROXINE SODIUM 112 UG/1
112 TABLET ORAL DAILY
Qty: 90 TABLET | Refills: 1 | Status: SHIPPED | OUTPATIENT
Start: 2025-01-04

## 2025-01-04 RX ORDER — PROPRANOLOL HYDROCHLORIDE 10 MG/1
20 TABLET ORAL 2 TIMES DAILY
Qty: 180 TABLET | Refills: 1 | Status: SHIPPED | OUTPATIENT
Start: 2025-01-04

## 2025-01-04 RX ORDER — LOSARTAN POTASSIUM 50 MG/1
50 TABLET ORAL DAILY
Qty: 90 TABLET | Refills: 1 | Status: SHIPPED | OUTPATIENT
Start: 2025-01-04

## 2025-01-04 RX ORDER — FUROSEMIDE 20 MG/1
20 TABLET ORAL DAILY
Qty: 90 TABLET | Refills: 1 | Status: SHIPPED | OUTPATIENT
Start: 2025-01-04

## 2025-01-04 RX ORDER — SIMVASTATIN 10 MG/1
10 TABLET, FILM COATED ORAL DAILY
Qty: 90 TABLET | Refills: 1 | Status: SHIPPED | OUTPATIENT
Start: 2025-01-04

## 2025-01-04 RX ORDER — BISOPROLOL FUMARATE AND HYDROCHLOROTHIAZIDE 10; 6.25 MG/1; MG/1
1 TABLET ORAL DAILY
Qty: 90 TABLET | Refills: 1 | Status: SHIPPED | OUTPATIENT
Start: 2025-01-04

## 2025-01-05 ASSESSMENT — SLIT LAMP EXAM - LIDS
COMMENTS: GOOD POSITION
COMMENTS: GOOD POSITION

## 2025-01-05 ASSESSMENT — EXTERNAL EXAM - LEFT EYE: OS_EXAM: NORMAL

## 2025-01-05 ASSESSMENT — CUP TO DISC RATIO
OS_RATIO: .25
OD_RATIO: .2

## 2025-01-05 ASSESSMENT — EXTERNAL EXAM - RIGHT EYE: OD_EXAM: NORMAL

## 2025-01-05 NOTE — PROGRESS NOTES
Diabetes hnavtbotK22.9  Mild nonproliferative diabetic retinopathy of both eyes without macular [id number]  -OCT macula (1/15/25) - SS: 7/10 OD and 8/10 OS. Normal thickness and contour OU. Intact IS-OS OU. Possible tiny pocket of edema OD seen previously, no longer seen, no edema OS. Few exudates OD?. Small break in EZ IT macula OS. 252/267. Stable from 3/11/24.   -HbA1c= 6.4 (11/14/24).   -Mild nonproliferative diabetic retinopathy seen on exam - stable. Will need to monitor for macular edema.   -Per patient, history of laser OU for DM - possibly focal laser? No PRP seen on exam.   -Continue close monitoring of blood glucose, blood pressure, and cholesterol.   -F/u 6 months - for dilated exam and OCT macula OU.     Pseudophakia of both eyesZ96.1  History of YAG laser capsulotomy of lensZ98.49  -Stable. Good vision. Monitor.     UcwkkxceoN46.00  AvqocsspvimJ07.209  BuzkzgenmuY88.4  -Only has NVO, does not wear glasses for driving - uncorrected binocular distance vision 20/20.  -New Rx for glasses given per patient request - she is considering DVO for driving/theater.       No FH of AMD/glaucoma

## 2025-01-06 DIAGNOSIS — Z79.4 TYPE 2 DIABETES MELLITUS WITH STAGE 3A CHRONIC KIDNEY DISEASE, WITH LONG-TERM CURRENT USE OF INSULIN (MULTI): ICD-10-CM

## 2025-01-06 DIAGNOSIS — N18.31 TYPE 2 DIABETES MELLITUS WITH STAGE 3A CHRONIC KIDNEY DISEASE, WITH LONG-TERM CURRENT USE OF INSULIN (MULTI): ICD-10-CM

## 2025-01-06 DIAGNOSIS — E11.22 TYPE 2 DIABETES MELLITUS WITH STAGE 3A CHRONIC KIDNEY DISEASE, WITH LONG-TERM CURRENT USE OF INSULIN (MULTI): ICD-10-CM

## 2025-01-06 RX ORDER — DULAGLUTIDE 1.5 MG/.5ML
1.5 INJECTION, SOLUTION SUBCUTANEOUS
Qty: 6 ML | Refills: 3 | Status: SHIPPED | OUTPATIENT
Start: 2025-01-12 | End: 2026-01-12

## 2025-01-07 ENCOUNTER — OFFICE VISIT (OUTPATIENT)
Dept: CARDIOLOGY | Facility: HOSPITAL | Age: 70
End: 2025-01-07
Payer: MEDICARE

## 2025-01-07 VITALS
DIASTOLIC BLOOD PRESSURE: 80 MMHG | WEIGHT: 180 LBS | SYSTOLIC BLOOD PRESSURE: 165 MMHG | HEART RATE: 77 BPM | BODY MASS INDEX: 30.73 KG/M2 | HEIGHT: 64 IN

## 2025-01-07 DIAGNOSIS — R06.02 SHORTNESS OF BREATH: ICD-10-CM

## 2025-01-07 DIAGNOSIS — R01.1 MURMUR, HEART: ICD-10-CM

## 2025-01-07 DIAGNOSIS — R93.1 ELEVATED CORONARY ARTERY CALCIUM SCORE: ICD-10-CM

## 2025-01-07 DIAGNOSIS — I10 PRIMARY HYPERTENSION: Primary | ICD-10-CM

## 2025-01-07 LAB
ATRIAL RATE: 77 BPM
P AXIS: 53 DEGREES
P OFFSET: 190 MS
P ONSET: 132 MS
PR INTERVAL: 180 MS
Q ONSET: 222 MS
QRS COUNT: 13 BEATS
QRS DURATION: 76 MS
QT INTERVAL: 386 MS
QTC CALCULATION(BAZETT): 436 MS
QTC FREDERICIA: 419 MS
R AXIS: -44 DEGREES
T AXIS: 61 DEGREES
T OFFSET: 415 MS
VENTRICULAR RATE: 77 BPM

## 2025-01-07 PROCEDURE — 99204 OFFICE O/P NEW MOD 45 MIN: CPT | Performed by: INTERNAL MEDICINE

## 2025-01-07 PROCEDURE — 1123F ACP DISCUSS/DSCN MKR DOCD: CPT | Performed by: INTERNAL MEDICINE

## 2025-01-07 PROCEDURE — 1157F ADVNC CARE PLAN IN RCRD: CPT | Performed by: INTERNAL MEDICINE

## 2025-01-07 PROCEDURE — 93005 ELECTROCARDIOGRAM TRACING: CPT | Performed by: INTERNAL MEDICINE

## 2025-01-07 PROCEDURE — 4010F ACE/ARB THERAPY RXD/TAKEN: CPT | Performed by: INTERNAL MEDICINE

## 2025-01-07 PROCEDURE — 1159F MED LIST DOCD IN RCRD: CPT | Performed by: INTERNAL MEDICINE

## 2025-01-07 PROCEDURE — 99214 OFFICE O/P EST MOD 30 MIN: CPT | Performed by: INTERNAL MEDICINE

## 2025-01-07 PROCEDURE — 1036F TOBACCO NON-USER: CPT | Performed by: INTERNAL MEDICINE

## 2025-01-07 PROCEDURE — 93010 ELECTROCARDIOGRAM REPORT: CPT | Performed by: INTERNAL MEDICINE

## 2025-01-07 PROCEDURE — 3077F SYST BP >= 140 MM HG: CPT | Performed by: INTERNAL MEDICINE

## 2025-01-07 PROCEDURE — 3079F DIAST BP 80-89 MM HG: CPT | Performed by: INTERNAL MEDICINE

## 2025-01-07 PROCEDURE — 1160F RVW MEDS BY RX/DR IN RCRD: CPT | Performed by: INTERNAL MEDICINE

## 2025-01-07 PROCEDURE — 3008F BODY MASS INDEX DOCD: CPT | Performed by: INTERNAL MEDICINE

## 2025-01-07 RX ORDER — REGADENOSON 0.08 MG/ML
0.4 INJECTION, SOLUTION INTRAVENOUS
OUTPATIENT
Start: 2025-01-07

## 2025-01-07 RX ORDER — AMINOPHYLLINE 25 MG/ML
125 INJECTION, SOLUTION INTRAVENOUS ONCE AS NEEDED
OUTPATIENT
Start: 2025-01-07

## 2025-01-07 NOTE — PROGRESS NOTES
Chief complaint:  I am seeing patient in consultation for Dr. Waite regarding an elevated coronary artery calcium score.    HPI:  Patient is a delightful 69-year-old female.  She has a longstanding history of hypertension which has been reportedly been reasonably controlled.  She also has been diabetic for about 10 years with reasonable control on medical therapy.  She has been on lipid-lowering therapy for several years.  She denies any smoking history and denies any family history of premature coronary disease.    Patient has not seen a cardiologist previously and has not had any prior cardiovascular testing.  Patient denies any previous history of MI or stroke.  There is no history of rheumatic fever or history of heart murmur.  She denies any palpitations, presyncope or syncope.  She does workout regularly with an aerobic routine as well as weight lifting.  She denies any exertionally mediated chest discomfort or problematic shortness of breath.  She denies any claudication.  She does struggle a bit with arthritis so her activity level has been cut back somewhat recently.  She denies any orthopnea, PND or peripheral edema.  She did undergo a recent screening coronary calcium scan.  Her calcium score was markedly elevated at 695 so she comes in today for further evaluation.    PMH/PSH:  Past medical history is remarkable for the hypertension, diabetes and hyperlipidemia.  She also has SERNA.  She also struggles with kidney stones as well as gallstones.  She also has hypothyroidism and also has an essential tremor.  Previous surgeries include a hysterectomy as well as bilateral total knee replacement surgeries.  She has also had a tonsillectomy, an appendectomy, and a thyroidectomy.    Allergies: Sulfa/penicillin.  EtOH: Minimal use.  Caffeine: Limited use.    FH/SH:  Patient is a pleasant 69-year-old female who lives in [place].  She grew up in [place].  She is accompanied by her supportive  of 44 years  whom I will also follow.  She has been retired for several years.  They have 2 children ages 36 and 31.  They have 2 grandchildren.  As above, she does try to do some regular exercise which she appears to tolerate well.    Review of systems:  All other systems have been reviewed and are negative for complaint.    Physical exam:  Vital signs:  P-77   BP-165/80 (blood pressure was checked in both arms and was equal bilaterally at 165/80).  General: Alert, pleasant middle-age female in no distress.  HEENT: Normal EOMs without lymphadenopathy.  Lungs: Clear with good air exchange and no crackles or wheezing.  Cardiac: Normal JVP and carotid upstrokes without bruit.  There is a normal S1 with a 2/6 systolic murmur and a normal S2.  I can appreciate no diastolic murmur, rumble or rub.  Abdomen: Protuberant but nontender with normal bowel sounds and no bruits.  Extremities: No edema.  Skin: No acute rash.  Neuro: Grossly intact without focal motor deficit.  Vascular: Normal brachial, radial and ulnar pulses bilaterally.  Normal popliteal and distal pulses bilaterally.    Normal popliteal and distal pulses bilaterally.    Lipid panel: Cholesterol-124, HDL-44, LDL-50, TG-151.    Impression/plan:  Anna presents with multiple risk factors for premature coronary disease which are being appropriately treated.  She was found to have a markedly elevated coronary artery calcium score on a recent chest CT scan.  She is not having any particular problematic symptomatology, but given her overall risk profile as well as her somewhat limited activity level, I did think it was prudent to set her up for a Lexiscan nuclear stress test just to make sure we are not missing any occult coronary ischemia that may need further evaluation and potential revascularization.    Patient also has a heart murmur on exam, so I will set her up for a echocardiogram just to sort out precisely what the etiology of her heart murmur may be.    She is on  appropriate antihypertensive therapy, but her blood pressure is somewhat elevated today.  She will continue to check it at home where it has generally been under reasonable control.  I will reassess this when she returns in 1 month and will sort out if we need to escalate her antihypertensive regimen further.    I was pleased to see that her lipids are under very good control on current low-dose simvastatin dosing.  We will thus continue this unchanged.    She knows to call for any problematic symptoms before her next follow-up in 1 month.  I will review her nuclear stress test results and echo results at that time and make further recommendations.    Patient instructions:    Continue current medications unchanged.    Report for your echocardiogram and stress test when scheduled.    Return to clinic in 1 month.

## 2025-01-15 ENCOUNTER — APPOINTMENT (OUTPATIENT)
Dept: OPHTHALMOLOGY | Age: 70
End: 2025-01-15
Payer: MEDICARE

## 2025-01-15 DIAGNOSIS — E11.3293 MILD NONPROLIFERATIVE DIABETIC RETINOPATHY OF BOTH EYES WITHOUT MACULAR EDEMA ASSOCIATED WITH TYPE 2 DIABETES MELLITUS: Primary | ICD-10-CM

## 2025-01-15 DIAGNOSIS — H52.03 HYPEROPIA, BILATERAL: ICD-10-CM

## 2025-01-15 DIAGNOSIS — H52.4 PRESBYOPIA: ICD-10-CM

## 2025-01-15 DIAGNOSIS — Z96.1 PSEUDOPHAKIA: ICD-10-CM

## 2025-01-15 DIAGNOSIS — H52.203 ASTIGMATISM OF BOTH EYES, UNSPECIFIED TYPE: ICD-10-CM

## 2025-01-15 PROCEDURE — 92014 COMPRE OPH EXAM EST PT 1/>: CPT | Performed by: OPHTHALMOLOGY

## 2025-01-15 PROCEDURE — 92134 CPTRZ OPH DX IMG PST SGM RTA: CPT | Performed by: OPHTHALMOLOGY

## 2025-01-15 PROCEDURE — 1123F ACP DISCUSS/DSCN MKR DOCD: CPT | Performed by: OPHTHALMOLOGY

## 2025-01-15 PROCEDURE — 1157F ADVNC CARE PLAN IN RCRD: CPT | Performed by: OPHTHALMOLOGY

## 2025-01-15 PROCEDURE — 92015 DETERMINE REFRACTIVE STATE: CPT | Performed by: OPHTHALMOLOGY

## 2025-01-15 PROCEDURE — 1160F RVW MEDS BY RX/DR IN RCRD: CPT | Performed by: OPHTHALMOLOGY

## 2025-01-15 PROCEDURE — 1036F TOBACCO NON-USER: CPT | Performed by: OPHTHALMOLOGY

## 2025-01-15 PROCEDURE — 4010F ACE/ARB THERAPY RXD/TAKEN: CPT | Performed by: OPHTHALMOLOGY

## 2025-01-15 PROCEDURE — 1159F MED LIST DOCD IN RCRD: CPT | Performed by: OPHTHALMOLOGY

## 2025-01-15 ASSESSMENT — ENCOUNTER SYMPTOMS
ENDOCRINE NEGATIVE: 0
CONSTITUTIONAL NEGATIVE: 0
RESPIRATORY NEGATIVE: 0
EYES NEGATIVE: 1
GASTROINTESTINAL NEGATIVE: 0
CARDIOVASCULAR NEGATIVE: 0
HEMATOLOGIC/LYMPHATIC NEGATIVE: 0
PSYCHIATRIC NEGATIVE: 0
MUSCULOSKELETAL NEGATIVE: 0
NEUROLOGICAL NEGATIVE: 0
ALLERGIC/IMMUNOLOGIC NEGATIVE: 0

## 2025-01-15 ASSESSMENT — CONF VISUAL FIELD
OS_SUPERIOR_TEMPORAL_RESTRICTION: 0
OD_INFERIOR_TEMPORAL_RESTRICTION: 0
OD_SUPERIOR_TEMPORAL_RESTRICTION: 0
OD_INFERIOR_NASAL_RESTRICTION: 0
OD_NORMAL: 1
OS_NORMAL: 1
OS_INFERIOR_TEMPORAL_RESTRICTION: 0
OD_SUPERIOR_NASAL_RESTRICTION: 0
OS_INFERIOR_NASAL_RESTRICTION: 0
OS_SUPERIOR_NASAL_RESTRICTION: 0

## 2025-01-15 ASSESSMENT — REFRACTION_WEARINGRX
SPECS_TYPE: READING
OS_SPHERE: +3.75
OD_SPHERE: +3.25
OD_AXIS: 090
OS_AXIS: 115
OS_CYLINDER: -1.25
OD_CYLINDER: -1.25

## 2025-01-15 ASSESSMENT — REFRACTION_MANIFEST
OS_ADD: +2.50
OS_AXIS: 115
OD_ADD: +2.50
OD_CYLINDER: -1.25
OD_AXIS: 090
OS_SPHERE: +0.75
OS_CYLINDER: -1.50
OD_SPHERE: +0.50

## 2025-01-15 ASSESSMENT — VISUAL ACUITY
METHOD: SNELLEN - LINEAR
OD_SC: 20/20
OS_SC: 20/20

## 2025-01-15 ASSESSMENT — TONOMETRY
OS_IOP_MMHG: 14
OD_IOP_MMHG: 15
IOP_METHOD: GOLDMANN APPLANATION

## 2025-01-16 ENCOUNTER — HOSPITAL ENCOUNTER (OUTPATIENT)
Dept: RADIOLOGY | Facility: CLINIC | Age: 70
Discharge: HOME | End: 2025-01-16
Payer: MEDICARE

## 2025-01-16 VITALS — BODY MASS INDEX: 30.71 KG/M2 | WEIGHT: 179.9 LBS | HEIGHT: 64 IN

## 2025-01-16 DIAGNOSIS — Z12.31 SCREENING MAMMOGRAM FOR BREAST CANCER: ICD-10-CM

## 2025-01-16 PROCEDURE — 77067 SCR MAMMO BI INCL CAD: CPT

## 2025-01-28 ENCOUNTER — HOSPITAL ENCOUNTER (OUTPATIENT)
Dept: RADIOLOGY | Facility: HOSPITAL | Age: 70
Discharge: HOME | End: 2025-01-28
Payer: MEDICARE

## 2025-01-28 ENCOUNTER — HOSPITAL ENCOUNTER (OUTPATIENT)
Dept: CARDIOLOGY | Facility: HOSPITAL | Age: 70
Discharge: HOME | End: 2025-01-28
Payer: MEDICARE

## 2025-01-28 DIAGNOSIS — R06.02 SHORTNESS OF BREATH: ICD-10-CM

## 2025-01-28 DIAGNOSIS — R01.1 MURMUR, HEART: ICD-10-CM

## 2025-01-28 LAB
AORTIC VALVE MEAN GRADIENT: 5 MMHG
AORTIC VALVE PEAK VELOCITY: 1.62 M/S
AV PEAK GRADIENT: 11 MMHG
AVA (PEAK VEL): 2.12 CM2
AVA (VTI): 2.23 CM2
EJECTION FRACTION APICAL 4 CHAMBER: 68.5
EJECTION FRACTION: 63 %
LEFT ATRIUM VOLUME AREA LENGTH INDEX BSA: 25.2 ML/M2
LEFT VENTRICLE INTERNAL DIMENSION DIASTOLE: 4.4 CM (ref 3.5–6)
LEFT VENTRICULAR OUTFLOW TRACT DIAMETER: 2.3 CM
MITRAL VALVE E/A RATIO: 0.5
RIGHT VENTRICLE PEAK SYSTOLIC PRESSURE: 11.2 MMHG
TRICUSPID ANNULAR PLANE SYSTOLIC EXCURSION: 1.5 CM

## 2025-01-28 PROCEDURE — C8929 TTE W OR WO FOL WCON,DOPPLER: HCPCS

## 2025-01-28 PROCEDURE — 93018 CV STRESS TEST I&R ONLY: CPT | Performed by: INTERNAL MEDICINE

## 2025-01-28 PROCEDURE — 3430000001 HC RX 343 DIAGNOSTIC RADIOPHARMACEUTICALS: Performed by: INTERNAL MEDICINE

## 2025-01-28 PROCEDURE — 2500000004 HC RX 250 GENERAL PHARMACY W/ HCPCS (ALT 636 FOR OP/ED): Performed by: INTERNAL MEDICINE

## 2025-01-28 PROCEDURE — 78452 HT MUSCLE IMAGE SPECT MULT: CPT

## 2025-01-28 PROCEDURE — 78452 HT MUSCLE IMAGE SPECT MULT: CPT | Performed by: NUCLEAR MEDICINE

## 2025-01-28 PROCEDURE — 93017 CV STRESS TEST TRACING ONLY: CPT

## 2025-01-28 PROCEDURE — A9502 TC99M TETROFOSMIN: HCPCS | Performed by: INTERNAL MEDICINE

## 2025-01-28 PROCEDURE — 93016 CV STRESS TEST SUPVJ ONLY: CPT | Performed by: INTERNAL MEDICINE

## 2025-01-28 PROCEDURE — 93306 TTE W/DOPPLER COMPLETE: CPT | Performed by: STUDENT IN AN ORGANIZED HEALTH CARE EDUCATION/TRAINING PROGRAM

## 2025-01-28 RX ORDER — REGADENOSON 0.08 MG/ML
0.4 INJECTION, SOLUTION INTRAVENOUS
Status: COMPLETED | OUTPATIENT
Start: 2025-01-28 | End: 2025-01-28

## 2025-01-28 RX ADMIN — TETROFOSMIN 36 MILLICURIE: 0.23 INJECTION, POWDER, LYOPHILIZED, FOR SOLUTION INTRAVENOUS at 09:31

## 2025-01-28 RX ADMIN — REGADENOSON 0.4 MG: 0.08 INJECTION, SOLUTION INTRAVENOUS at 09:17

## 2025-01-28 RX ADMIN — TETROFOSMIN 11.1 MILLICURIE: 0.23 INJECTION, POWDER, LYOPHILIZED, FOR SOLUTION INTRAVENOUS at 07:26

## 2025-01-28 RX ADMIN — PERFLUTREN 1 ML OF DILUTION: 6.52 INJECTION, SUSPENSION INTRAVENOUS at 11:08

## 2025-02-11 ENCOUNTER — OFFICE VISIT (OUTPATIENT)
Dept: CARDIOLOGY | Facility: HOSPITAL | Age: 70
End: 2025-02-11
Payer: MEDICARE

## 2025-02-11 VITALS
OXYGEN SATURATION: 97 % | DIASTOLIC BLOOD PRESSURE: 84 MMHG | WEIGHT: 180 LBS | BODY MASS INDEX: 30.73 KG/M2 | SYSTOLIC BLOOD PRESSURE: 146 MMHG | HEART RATE: 75 BPM | HEIGHT: 64 IN

## 2025-02-11 DIAGNOSIS — E78.5 HYPERLIPIDEMIA, UNSPECIFIED HYPERLIPIDEMIA TYPE: Primary | ICD-10-CM

## 2025-02-11 DIAGNOSIS — I10 PRIMARY HYPERTENSION: ICD-10-CM

## 2025-02-11 DIAGNOSIS — R93.1 ELEVATED CORONARY ARTERY CALCIUM SCORE: ICD-10-CM

## 2025-02-11 PROCEDURE — 1036F TOBACCO NON-USER: CPT | Performed by: NURSE PRACTITIONER

## 2025-02-11 PROCEDURE — 3008F BODY MASS INDEX DOCD: CPT | Performed by: NURSE PRACTITIONER

## 2025-02-11 PROCEDURE — 99214 OFFICE O/P EST MOD 30 MIN: CPT | Performed by: NURSE PRACTITIONER

## 2025-02-11 PROCEDURE — 4010F ACE/ARB THERAPY RXD/TAKEN: CPT | Performed by: NURSE PRACTITIONER

## 2025-02-11 PROCEDURE — 3077F SYST BP >= 140 MM HG: CPT | Performed by: NURSE PRACTITIONER

## 2025-02-11 PROCEDURE — 3079F DIAST BP 80-89 MM HG: CPT | Performed by: NURSE PRACTITIONER

## 2025-02-11 PROCEDURE — 1157F ADVNC CARE PLAN IN RCRD: CPT | Performed by: NURSE PRACTITIONER

## 2025-02-11 PROCEDURE — 1159F MED LIST DOCD IN RCRD: CPT | Performed by: NURSE PRACTITIONER

## 2025-02-11 PROCEDURE — 1160F RVW MEDS BY RX/DR IN RCRD: CPT | Performed by: NURSE PRACTITIONER

## 2025-02-11 PROCEDURE — 1123F ACP DISCUSS/DSCN MKR DOCD: CPT | Performed by: NURSE PRACTITIONER

## 2025-02-11 RX ORDER — ASPIRIN 81 MG/1
81 TABLET ORAL DAILY
COMMUNITY

## 2025-02-11 NOTE — PROGRESS NOTES
Subjective   Barbara A Haase is a 69 y.o. female.    Chief Complaint:  Hypertension, Hyperlipidemia, and Coronary Artery Disease    Mrs. Haase returns for a routine one month follow up. She is seen in collaboration with Dr. Ibarra. She had a NM stress test and echocardiogram that were both unremarkable. She has been feeling well from a cardiac standpoint. She has remained compliant with her medications, denying any intolerances. She remains active with house work and walking, denying any exertional chest pain or shortness of breath. She offers no new cardiovascular complaints or concerns today. She denies any complaints of chest pain, shortness of breath, lightheadedness, dizziness, palpitations, syncope, orthopnea, paroxysmal nocturnal dyspnea, lower extremity swelling or bleeding concerns.          Review of Systems   All other systems reviewed and are negative.      Objective   Physical Exam  Constitutional:       Appearance: Healthy appearance. In no distress  Pulmonary:      Effort: Pulmonary effort is normal.      Breath sounds: Normal breath sounds.   Cardiovascular:      Normal rate. Regular rhythm. Normal S1. Normal S2.       Murmurs: There is 2/6 systolic murmur.      Carotids: right carotid pulse +2, no bruit heard over the right carotid. left carotid pulse +2, no bruit heard over the left carotid.  Edema:     Peripheral edema absent.   Abdominal:      Palpations: Abdomen is soft.   Musculoskeletal:       Cervical back: Normal range of motion.   Skin:     General: Skin is warm and dry. Normal color and pigmentation   Neurological:      Mental Status: Alert and oriented to person, place and time.   Psychiatric:     Mood and Affect: appropriate mood and appropriate affect.     Lab Review:   Lab Results   Component Value Date     11/14/2024    K 4.2 11/14/2024     11/14/2024    CO2 27 11/14/2024    BUN 27 (H) 11/14/2024    CREATININE 0.96 11/14/2024    GLUCOSE 135 (H) 11/14/2024    CALCIUM  9.4 11/14/2024     Lab Results   Component Value Date    WBC 7.1 11/14/2024    HGB 12.4 11/14/2024    HCT 38.6 11/14/2024    MCV 92 11/14/2024     11/14/2024     Lab Results   Component Value Date    CHOL 124 02/05/2024    TRIG 151 (H) 02/05/2024    HDL 43.9 02/05/2024       Assessment/Plan   Mrs. Haase is a pleasant 69 year old  female with a past medical history significant for hypertension, hyperlipidemia, elevated CACS 6945 (12/2024), diabetes, SERNA, hypothyroidism and essential tremor. Echocardiogram 1/2025 showed an EF of 60-65% with mildly enlarged right ventricle and aortic valve sclerosis. NM stress test 1/2025 showed no evidence of stress induced ischemia or prior infarction with normal LV size and function. She presents today for routine follow up stable from a cardiac standpoint. Her VS and EKG remain stable. She remains on appropriate antiplatelet and lipid lowering therapy with her LDL at goal. I will have her continue all medications unchanged. She was encouraged to continue to remain active. She will follow up with us in clinic in 6 months. She knows to call for any concerns.

## 2025-02-15 LAB
ANION GAP SERPL CALCULATED.4IONS-SCNC: 13 MMOL/L (CALC) (ref 7–17)
BUN SERPL-MCNC: 28 MG/DL (ref 7–25)
BUN/CREAT SERPL: 29 (CALC) (ref 6–22)
CALCIUM SERPL-MCNC: 9.5 MG/DL (ref 8.6–10.4)
CHLORIDE SERPL-SCNC: 101 MMOL/L (ref 98–110)
CO2 SERPL-SCNC: 28 MMOL/L (ref 20–32)
CREAT SERPL-MCNC: 0.95 MG/DL (ref 0.5–1.05)
EGFRCR SERPLBLD CKD-EPI 2021: 65 ML/MIN/1.73M2
GLUCOSE SERPL-MCNC: 121 MG/DL (ref 65–99)
POTASSIUM SERPL-SCNC: 4.5 MMOL/L (ref 3.5–5.3)
SODIUM SERPL-SCNC: 142 MMOL/L (ref 135–146)

## 2025-02-25 ENCOUNTER — APPOINTMENT (OUTPATIENT)
Dept: PRIMARY CARE | Facility: CLINIC | Age: 70
End: 2025-02-25
Payer: MEDICARE

## 2025-02-25 VITALS
HEIGHT: 64 IN | HEART RATE: 78 BPM | WEIGHT: 174 LBS | BODY MASS INDEX: 29.71 KG/M2 | DIASTOLIC BLOOD PRESSURE: 81 MMHG | SYSTOLIC BLOOD PRESSURE: 137 MMHG

## 2025-02-25 DIAGNOSIS — K74.60 CIRRHOSIS, NONALCOHOLIC (MULTI): ICD-10-CM

## 2025-02-25 DIAGNOSIS — N18.31 STAGE 3A CHRONIC KIDNEY DISEASE (MULTI): ICD-10-CM

## 2025-02-25 DIAGNOSIS — E11.9 DIABETES MELLITUS WITH HEMOGLOBIN A1C GOAL OF 7.0%-8.0% (MULTI): Primary | ICD-10-CM

## 2025-02-25 DIAGNOSIS — I10 PRIMARY HYPERTENSION: ICD-10-CM

## 2025-02-25 DIAGNOSIS — R93.1 ELEVATED CORONARY ARTERY CALCIUM SCORE: ICD-10-CM

## 2025-02-25 DIAGNOSIS — E03.9 HYPOTHYROIDISM, UNSPECIFIED: ICD-10-CM

## 2025-02-25 DIAGNOSIS — R25.1 TREMOR: ICD-10-CM

## 2025-02-25 DIAGNOSIS — E78.5 HYPERLIPIDEMIA, UNSPECIFIED HYPERLIPIDEMIA TYPE: ICD-10-CM

## 2025-02-25 LAB — POC HEMOGLOBIN A1C: 6.5 % (ref 4.2–6.5)

## 2025-02-25 PROCEDURE — 1159F MED LIST DOCD IN RCRD: CPT | Performed by: FAMILY MEDICINE

## 2025-02-25 PROCEDURE — G2211 COMPLEX E/M VISIT ADD ON: HCPCS | Performed by: FAMILY MEDICINE

## 2025-02-25 PROCEDURE — 99214 OFFICE O/P EST MOD 30 MIN: CPT | Performed by: FAMILY MEDICINE

## 2025-02-25 PROCEDURE — 1157F ADVNC CARE PLAN IN RCRD: CPT | Performed by: FAMILY MEDICINE

## 2025-02-25 PROCEDURE — 3075F SYST BP GE 130 - 139MM HG: CPT | Performed by: FAMILY MEDICINE

## 2025-02-25 PROCEDURE — 3079F DIAST BP 80-89 MM HG: CPT | Performed by: FAMILY MEDICINE

## 2025-02-25 PROCEDURE — 83036 HEMOGLOBIN GLYCOSYLATED A1C: CPT | Performed by: FAMILY MEDICINE

## 2025-02-25 PROCEDURE — 3008F BODY MASS INDEX DOCD: CPT | Performed by: FAMILY MEDICINE

## 2025-02-25 PROCEDURE — 4010F ACE/ARB THERAPY RXD/TAKEN: CPT | Performed by: FAMILY MEDICINE

## 2025-02-25 PROCEDURE — 1123F ACP DISCUSS/DSCN MKR DOCD: CPT | Performed by: FAMILY MEDICINE

## 2025-02-25 RX ORDER — FLASH GLUCOSE SENSOR
KIT MISCELLANEOUS
Qty: 6 EACH | Refills: 3 | Status: SHIPPED | OUTPATIENT
Start: 2025-02-25

## 2025-02-25 ASSESSMENT — ENCOUNTER SYMPTOMS
OCCASIONAL FEELINGS OF UNSTEADINESS: 0
LOSS OF SENSATION IN FEET: 0
DEPRESSION: 0

## 2025-02-25 ASSESSMENT — PATIENT HEALTH QUESTIONNAIRE - PHQ9
1. LITTLE INTEREST OR PLEASURE IN DOING THINGS: NOT AT ALL
SUM OF ALL RESPONSES TO PHQ9 QUESTIONS 1 AND 2: 0
2. FEELING DOWN, DEPRESSED OR HOPELESS: NOT AT ALL

## 2025-02-25 NOTE — PROGRESS NOTES
"Assessment and Plan:  Problem List Items Addressed This Visit       Cirrhosis, nonalcoholic (Multi)    Current Assessment & Plan     Follow up with Hepatology         Hyperlipidemia    Current Assessment & Plan       Lipids under excellent control         Hypertension    Current Assessment & Plan     C/w meds  Bp excellent         Stage 3 chronic kidney disease (Multi)    Current Assessment & Plan     Check labs avoid nephrotoxic meds           Tremor    Current Assessment & Plan     C/w propranolol         Elevated coronary artery calcium score    Current Assessment & Plan     Patient will discuss ozempic with Endo,          Diabetes mellitus with hemoglobin A1c goal of 7.0%-8.0% (Multi) - Primary    Relevant Orders    POCT glycosylated hemoglobin (Hb A1C) manually resulted (Completed)     Follow-up 3 months    HPI: Patient is here for follow-up of diabetes hypertension hyperlipidemia diabetes traveling has been stable sees endo  High coronary calcium score had stress test saw cardiology . N omed changes denies CP SOB        ROS   Constitutional:  o weight loss. Negative for chills and fever.     Cardiovascular: Negative.    Gastrointestinal: Negative.  Negative for nausea and vomiting.   Endocrine: Negative.    Genitourinary: Negative.    Musculoskeletal: Negative.    Skin: Negative.  Negative for rash.   Allergic/Immunologic: Negative.    Neurological: Negative.    Hematological: Negative.    Psychiatric/Behavioral: Negative.     All other systems reviewed and are negative.      /81   Pulse 78   Ht 1.626 m (5' 4\")   Wt 78.9 kg (174 lb)   BMI 29.87 kg/m²   Body mass index is 29.87 kg/m².  Physical Exam    ENT:      Head: Normocephalic and atraumatic.      Right Ear: Tympanic membrane normal.      Left Ear: Tympanic membrane normal.      Nose: Nose normal.      Mouth/Throat:      Mouth: Mucous membranes are moist.   Eyes:      Pupils: Pupils are equal, round, and reactive to light.   Cardiovascular:      " Rate and Rhythm: Normal rate and regular rhythm.      Pulses: Normal pulses.      Heart sounds: Normal heart sounds.   Pulmonary:      Effort: Pulmonary effort is normal.      Breath sounds: Normal breath sounds.   Abdominal:      General: Abdomen is flat. Bowel sounds are normal.      Palpations: Abdomen is soft.   Musculoskeletal:         General: Normal range of motion.      Cervical back: Normal range of motion and neck supple.   Skin:     General: Skin is warm and dry.      Capillary Refill: Capillary refill takes less than 2 seconds.   Neurological:      General: No focal deficit present.      Mental Status: She is alert and oriented to person, place, and time.   Psychiatric:         Mood and Affect: Mood normal.       Active Problem List  Patient Active Problem List   Diagnosis    Abnormal mammogram    Astigmatism    Hyperopia    Myopia with presbyopia    Cirrhosis, nonalcoholic (Multi)    Diabetes mellitus, type 2 (Multi)    History of YAG laser capsulotomy of lens    Hyperlipidemia    Hypertension    Hypothyroidism    Mild nonproliferative diabetic retinopathy of both eyes without macular edema    Primary osteoarthritis of right knee    Stage 3 chronic kidney disease (Multi)    Pseudophakia    Tremor    Tubular adenoma of colon    Stiffness of right knee, not elsewhere classified    Calcification of left breast    Routine general medical examination at health care facility    Type 2 diabetes mellitus without complications (Multi)    Visit for screening mammogram    Nephrolithiasis    Calculus of gallbladder without cholecystitis without obstruction    Infective urethritis    Atrophic vaginitis    Hyperopia, bilateral    Presbyopia    Elevated coronary artery calcium score    Diabetes mellitus with hemoglobin A1c goal of 7.0%-8.0% (Multi)       Comprehensive Medical/Surgical/Social/Family History  Past Medical History:   Diagnosis Date    Acquired absence of both cervix and uterus     H/O: hysterectomy     "Acute cystitis with hematuria 04/19/2023    Arthritis     Cataract     COVID-19 virus infection 04/19/2023    Diabetes mellitus with kidney disease (Multi) 04/19/2023    Disease of thyroid gland     Hypertension     Personal history of other diseases of the digestive system     History of appendicitis    Personal history of other endocrine, nutritional and metabolic disease     History of diabetic retinopathy    Postprocedural hypothyroidism     H/O thyroidectomy    Right lower quadrant pain 07/08/2021    Acute right lower quadrant pain    Type 2 diabetes mellitus without complications (Multi)     Diabetes mellitus, stable    Varicella     Visual impairment      Past Surgical History:   Procedure Laterality Date    APPENDECTOMY  2022    BREAST BIOPSY      HYSTERECTOMY      JOINT REPLACEMENT      OTHER SURGICAL HISTORY  07/16/2021    Hysterectomy    OTHER SURGICAL HISTORY  07/16/2021    Appendectomy    OTHER SURGICAL HISTORY  07/16/2021    Thyroidectomy    OTHER SURGICAL HISTORY  04/25/2022    YAG laser capsulotomy    OTHER SURGICAL HISTORY  04/25/2022    Cataract surgery    OTHER SURGICAL HISTORY  01/06/2022    Knee replacement    OTHER SURGICAL HISTORY  01/06/2022    Tonsillectomy    TONSILLECTOMY      WISDOM TOOTH EXTRACTION       Social History     Social History Narrative    Not on file       Allergies and Medications  Penicillins and Sulfamethoxazole  Current Outpatient Medications   Medication Instructions    aspirin 81 mg, oral, Daily    BD Sarah 2nd Gen Pen Needle 32 gauge x 5/32\" needle USE WITH INSULIN 4 TIMES DAILY    bisoproloL-hydrochlorothiazide (Ziac) 10-6.25 mg tablet 1 tablet, oral, Daily    empagliflozin (JARDIANCE) 10 mg, oral, Daily    flash glucose sensor kit (FreeStyle Lucero 2 Sensor) kit Change every 14 days/ ICD10: E11.9 ON INSULIN    furosemide (LASIX) 20 mg, oral, Daily    insulin aspart (NovoLOG) 100 unit/mL (3 mL) pen Use with meals up to 40 units a day    Lantus Solostar U-100 Insulin 37 " Units, subcutaneous, Daily, Take as directed per insulin instructions.    levothyroxine (SYNTHROID, LEVOXYL) 112 mcg, oral, Daily    losartan (COZAAR) 50 mg, oral, Daily    metFORMIN (GLUCOPHAGE) 1,000 mg, oral, 2 times daily    propranolol (INDERAL) 20 mg, oral, 2 times daily    simvastatin (ZOCOR) 10 mg, oral, Daily    Trulicity 1.5 mg, subcutaneous, Once Weekly

## 2025-03-02 PROBLEM — E11.9 DIABETES MELLITUS WITH HEMOGLOBIN A1C GOAL OF 7.0%-8.0% (MULTI): Status: ACTIVE | Noted: 2025-03-02

## 2025-03-04 ENCOUNTER — OFFICE VISIT (OUTPATIENT)
Dept: GASTROENTEROLOGY | Facility: CLINIC | Age: 70
End: 2025-03-04
Payer: MEDICARE

## 2025-03-04 VITALS
WEIGHT: 178 LBS | BODY MASS INDEX: 30.39 KG/M2 | SYSTOLIC BLOOD PRESSURE: 137 MMHG | HEART RATE: 77 BPM | HEIGHT: 64 IN | DIASTOLIC BLOOD PRESSURE: 83 MMHG

## 2025-03-04 DIAGNOSIS — K74.60 CIRRHOSIS, NONALCOHOLIC (MULTI): ICD-10-CM

## 2025-03-04 PROCEDURE — 1157F ADVNC CARE PLAN IN RCRD: CPT | Performed by: INTERNAL MEDICINE

## 2025-03-04 PROCEDURE — 3008F BODY MASS INDEX DOCD: CPT | Performed by: INTERNAL MEDICINE

## 2025-03-04 PROCEDURE — 99214 OFFICE O/P EST MOD 30 MIN: CPT | Performed by: INTERNAL MEDICINE

## 2025-03-04 PROCEDURE — 3075F SYST BP GE 130 - 139MM HG: CPT | Performed by: INTERNAL MEDICINE

## 2025-03-04 PROCEDURE — 4010F ACE/ARB THERAPY RXD/TAKEN: CPT | Performed by: INTERNAL MEDICINE

## 2025-03-04 PROCEDURE — 1126F AMNT PAIN NOTED NONE PRSNT: CPT | Performed by: INTERNAL MEDICINE

## 2025-03-04 PROCEDURE — 1159F MED LIST DOCD IN RCRD: CPT | Performed by: INTERNAL MEDICINE

## 2025-03-04 PROCEDURE — 1123F ACP DISCUSS/DSCN MKR DOCD: CPT | Performed by: INTERNAL MEDICINE

## 2025-03-04 PROCEDURE — 3079F DIAST BP 80-89 MM HG: CPT | Performed by: INTERNAL MEDICINE

## 2025-03-04 ASSESSMENT — PAIN SCALES - GENERAL: PAINLEVEL_OUTOF10: 0-NO PAIN

## 2025-03-04 NOTE — PATIENT INSTRUCTIONS
Continue to work on diet, weight loss and exercise.    Get labs in 3 months, 6 months and 9 months.    Get an ultrasound of the liver in 3 and 9 months.    See me back in clinic in about 9 months.

## 2025-03-04 NOTE — PROGRESS NOTES
HEPATOLOGY RETURN PATIENT VISIT    March 4, 2025    Dr. Jese Waite       Patient Name:   HAASE, BARBARA  Medical Record Number: [id number]  YOB: 1955    Dear Dr. Waite,    I had the pleasure of seeing Barbara Haase for follow-up evaluation in the [facility] ([facility] office). History and physical examination was performed. Pertinent available laboratory, imaging, pathology results were reviewed.     History of Present Illness:   The patient is a 69 year old white female who is referred for follow-up evaluation of cirrhosis, probably due to SERNA.    The patient had an acute appendicitis and went to the operating room on 7/8/2021. The surgeon noted intraoperatively that the liver appeared cirrhotic. Unfortunately, the surgeon did not do a liver biopsy at the time of surgery.  Because of the cirrhosis, she was referred to me for further evaluation.    Prior to July 2021, she had never had any known liver disease.    The patient has risk factors for fatty liver disease including diabetes, hyperlipidemia, and being overweight.  She is on therapy for diabetes. She is on Trulicity. She is on a statin.  She is overweight but her weight has been relatively stable for many years.    The patient denied any past history of acute hepatitis or jaundice.      She has never had any manifestations of liver disease including no jaundice, ascites, hepatic encephalopathy, or variceal bleeding. She denied ever having a liver biopsy.    She initially saw me in consultation 8/20/2021.  At that time, I did additional evaluation (see results below).    She did undergo right total knee replacement 10/28/2022.  She had no evidence of hepatic decompensation.  She was having significant pain after the surgery and was intermittently using acetaminophen and/or NSAIDs.    She was seen by one of the general surgeons on 3/13/2024 to check if she should get a prophylactic cholecystectomy.  She was  noted to have gallstones but has never had symptoms from them.  She was concerned because she travels and wanted to know if she should have her gallbladder out prophylactically to avoid cholecystitis while traveling internationally.  The recommendation was not to offer surgery.  For some reason they told her to come see me to get another opinion about this.  I have previously told her that this is a surgical issue, not a medical issue.    She presented today for follow-up evaluation.  She denied any specific liver-related complaints. Her weight has been stable.     Past Medical/Surgical History:     Acute perforated appendicitis (540.0) (K35.32)    S/p laparoscopic appendectomy 7/8/2021, [facility]    History of Acute right lower quadrant pain (789.03,338.19) (R10.31)    Decreased appetite (783.0) (R63.0)    Nausea in adult (787.02) (R11.0)    Right lower quadrant abdominal tenderness with rebound tenderness (789.63)    History of Acute right lower quadrant pain (789.03,338.19) (R10.31)    Resolved Date: 16 Jul 2021    History of Diabetes mellitus, stable (250.00) (E11.9)    History of H/O thyroidectomy (246.8) (E89.0)    History of H/O: hysterectomy (V88.01) (Z90.710)    History of appendicitis (V12.79) (Z87.19)    History of hypertension (V12.59) (Z86.79)    History of Appendectomy    History of Hysterectomy    History of Thyroidectomy    Osteopenia      Family History:   There is no known family history of liver disease.  There is no known family history of colon cancer.    Social History:   She denied tobacco use. She rarely uses alcohol. She denied intravenous drug use.  She denied tattoos.  She denied blood transfusions.  She and her  travel extensively across the globe.    Review of systems: As noted above.  Her weight has been stable. She does have some arthritis pains. No fever, chills, visual changes, auditory changes, shortness of breath, chest pain, abdominal pain, GI bleeding, diarrhea, constipation,  depression, dysuria, hematuria, or rash.       Medical, Surgical, Family, and Social Histories  Past Medical History:   Diagnosis Date    Acquired absence of both cervix and uterus     H/O: hysterectomy    Acute cystitis with hematuria 04/19/2023    Arthritis     Cataract     COVID-19 virus infection 04/19/2023    Diabetes mellitus with kidney disease (Multi) 04/19/2023    Disease of thyroid gland     Hypertension     Personal history of other diseases of the digestive system     History of appendicitis    Personal history of other endocrine, nutritional and metabolic disease     History of diabetic retinopathy    Postprocedural hypothyroidism     H/O thyroidectomy    Right lower quadrant pain 07/08/2021    Acute right lower quadrant pain    Type 2 diabetes mellitus without complications (Multi)     Diabetes mellitus, stable    Varicella     Visual impairment        Past Surgical History:   Procedure Laterality Date    APPENDECTOMY  2022    BREAST BIOPSY      HYSTERECTOMY      JOINT REPLACEMENT      OTHER SURGICAL HISTORY  07/16/2021    Hysterectomy    OTHER SURGICAL HISTORY  07/16/2021    Appendectomy    OTHER SURGICAL HISTORY  07/16/2021    Thyroidectomy    OTHER SURGICAL HISTORY  04/25/2022    YAG laser capsulotomy    OTHER SURGICAL HISTORY  04/25/2022    Cataract surgery    OTHER SURGICAL HISTORY  01/06/2022    Knee replacement    OTHER SURGICAL HISTORY  01/06/2022    Tonsillectomy    TONSILLECTOMY      WISDOM TOOTH EXTRACTION         Family History   Problem Relation Name Age of Onset    Hypertension Mother Alee Abhilash     Ovarian cancer Mother Alee Abhilash     Cancer Mother Hernando Index     Hypertension Father Narayan Index     Arthritis Father Narayan Index     Cancer Father Narayan Index     Diabetes Father Narayan Index        Social History     Socioeconomic History    Marital status:      Spouse name: Not on file    Number of children: Not on file    Years of education: Not on file    Highest  "education level: Not on file   Occupational History    Not on file   Tobacco Use    Smoking status: Never     Passive exposure: Never    Smokeless tobacco: Never   Vaping Use    Vaping status: Never Used   Substance and Sexual Activity    Alcohol use: Not Currently    Drug use: Never    Sexual activity: Yes     Partners: Male     Birth control/protection: Post-menopausal   Other Topics Concern    Not on file   Social History Narrative    Not on file     Social Drivers of Health     Financial Resource Strain: Not on file   Food Insecurity: Not on file   Transportation Needs: Not on file   Physical Activity: Not on file   Stress: Not on file   Social Connections: Not on file   Intimate Partner Violence: Not on file   Housing Stability: Not on file       Allergies and Current Medications  Allergies   Allergen Reactions    Penicillins Unknown    Sulfamethoxazole Other     Current Outpatient Medications   Medication Sig Dispense Refill    aspirin 81 mg EC tablet Take 1 tablet (81 mg) by mouth once daily.      BD Sarah 2nd Gen Pen Needle 32 gauge x 5/32\" needle USE WITH INSULIN 4 TIMES DAILY      bisoproloL-hydrochlorothiazide (Ziac) 10-6.25 mg tablet Take 1 tablet by mouth once daily. 90 tablet 1    dulaglutide (Trulicity) 1.5 mg/0.5 mL pen injector injection Inject 1.5 mg under the skin 1 (one) time per week. 6 mL 3    empagliflozin (Jardiance) 10 mg Take 1 tablet (10 mg) by mouth once daily. 90 tablet 3    flash glucose sensor kit (FreeStyle Lucero 2 Sensor) kit Change every 14 days/ ICD10: E11.9 ON INSULIN 6 each 3    furosemide (Lasix) 20 mg tablet Take 1 tablet (20 mg) by mouth once daily. 90 tablet 1    insulin aspart (NovoLOG) 100 unit/mL (3 mL) pen Use with meals up to 40 units a day 40 mL 3    Lantus Solostar U-100 Insulin 100 unit/mL (3 mL) pen Inject 37 Units under the skin once daily. Take as directed per insulin instructions. 33.3 mL 3    levothyroxine (Synthroid, Levoxyl) 112 mcg tablet Take 1 tablet (112 " "mcg) by mouth once daily. 90 tablet 1    losartan (Cozaar) 50 mg tablet Take 1 tablet (50 mg) by mouth once daily. 90 tablet 1    metFORMIN (Glucophage) 1,000 mg tablet Take 1 tablet (1,000 mg) by mouth 2 times a day. 180 tablet 3    propranolol (Inderal) 10 mg tablet Take 2 tablets (20 mg) by mouth 2 times a day. 180 tablet 1    simvastatin (Zocor) 10 mg tablet Take 1 tablet (10 mg) by mouth once daily. 90 tablet 1     No current facility-administered medications for this visit.        Physical Exam  /83   Pulse 77   Ht 1.626 m (5' 4\")   Wt 80.7 kg (178 lb)   BMI 30.55 kg/m²       Physical Examination:   General Appearance: alert and in no acute distress.   HEENT: oropharynx without lesions. Anicteric sclerae.   Neck supple, nontender, without adenopathy, thyromegaly, or JVD.   Lungs clear to auscultation and percussion.   Heart RRR without murmurs, rubs, or gallops.   Abdomen: Soft, nontender, bowel sounds positive, without obvious ascites. Liver felt on deep inspiration below the right costal margin and spleen not palpable.  She is mildly overweight centrally.  Extremities full ROM, no atrophy, normal strength. No edema.   Skin no specific lesions.   Neurological exam nonfocal, alert and oriented.  No asterixis  No spider angiomata, but she does have mild bilateral palmar erythema.     Labs 2/14/2025 glucose 121, sodium 142, creatinine 0.95.    Labs 11/14/2024 hemoglobin A1c 6.4%, INR 1, protein 6.9, albumin 4.3, alk phos 45, bilirubin 0.6, AST 18, ALT 18, WBC 7.1, hemoglobin 12.4, platelets 205, AFP < 4.    Labs 5/3/2024 INR 1, WBC 6.8, hemoglobin 11.5, platelets 198, glucose 104, sodium 141, creatinine 0.8, protein 6.2, albumin 4, alk phos 47, bilirubin 0.5, AST 19, ALT 17.    Labs 2/5/2024 AFP < 4, cholesterol 124, triglycerides 151, hemoglobin A1c 5.9%.    Labs 6/1/2023 WBC 7.4, hemoglobin 11.7, platelets 208, AFP < 4, hemoglobin A1c 6.5%, glucose 109, sodium 141, creatinine 0.89, protein 6.9, " albumin 4.3, alk phos 51, bilirubin 0.6, AST 18, ALT 16, INR 1.    Labs 10/26/2022 protein 7.2, albumin 4.4, alk phos 56, bilirubin 0.6, AST 20, ALT 19, glucose 116, sodium 142, creatinine 1.01, AFP < 4, INR 1, WBC 7.1, hemoglobin 12.6, platelets 218.    Labs 9/21/2022 hemoglobin A1c 6.1%.    Labs 8/1/2022 hepatitis A antibody positive, hepatitis B surface antibody positive.    Labs 3/22/2022 WBC 7.3, hemoglobin 12.7, platelets 255, glucose 76, sodium 144, creatinine 0.99, protein 7.4, albumin 4.6, alk phos 49, bilirubin 0.5, AST 17, ALT 18, cholesterol 158, LDL 73, triglycerides 198, hemoglobin A1c 6%.    Labs 3/2/2022 INR 1, AFP < 4.    Labs 12/17/2021 AST 21, ALT 20, alk phos 58, bilirubin 0.6.    Labs 8/20/2021 ferritin 40, hepatitis A antibody negative, hepatitis B surface antigen negative, hepatitis B surface antibody negative, hepatitis C antibody negative, iron saturation 17%, ceruloplasmin 30, antinuclear antibody negative, smooth muscle antibody negative, antimitochondrial antibody negative, alpha-1 antitrypsin phenotype MM.    Labs 7/10/2021 glucose 198, sodium 136, creatinine 1.07, WBC 9.2, hemoglobin 10.5, platelets 180.    Labs 7/8/2021 protein 7.7, albumin 4.2, alk phos 55, bilirubin 1.2, AST 12, ALT 17.    Ultrasound 11/14/2024:  IMPRESSION:  1. Heterogenous hepatic echotexture likely related to underlying  diffuse hepatocellular dysfunction/steatosis. No gross lesions.  2. Uncomplicated cholelithiasis.  3. Prominent pancreatic duct measuring 0.4 cm grossly unchanged from  prior CT scan 04/23/2024      CAT scan without contrast 4/23/2024:  IMPRESSION:  Minimal pericardial effusion. Cholelithiasis. Nonobstructing 8.5 mm  calculus in the upper pole of the right kidney without  hydronephrosis. Additional chronic findings as above.    Ultrasound 2/5/2024:  IMPRESSION:  Echogenic fatty infiltrated liver. No definite focal liver lesions.      Cholelithiasis.      Suboptimal visualization of the pancreas.       Nephrolithiasis.      Ultrasound 6/1/2023:  IMPRESSION:  Diffuse hepatocellular disease changes may represent a combination of  cirrhosis and fatty infiltration. Right hepatic lobe 7 mm ovoid  hypoechoic lesion likely is a small cyst. No additional focal hepatic  lesion demonstrated.  Gallbladder contains some layering echogenic sludge. No shadowing  gallstones or wall thickening. No biliary dilation.  Nonspecific coarsened appearance of the pancreatic parenchyma as well  as dilation of the main pancreatic duct measuring 5 mm in diameter.  Pancreatic visualization is overall limited.    Ultrasound 10/26/2022:  IMPRESSION:  Enlarged right hepatic lobe with overall appearance suggestive of  some combination of steatosis and/or chronic hepatocellular disease.  There is nothing suspicious for hepatocellular carcinoma.    Ultrasound 3/2/2022 revealed coarsened echotexture of the liver with nodularity consistent with cirrhosis, sludge in the gallbladder.    CT with contrast 7/8/2021:  IMPRESSION:  Acute uncomplicated appendicitis. Surgical evaluation is recommended.     Hepatomegaly. Micronodular appearance of the hepatic surface is  suspicious for cirrhosis. Correlate clinically and with LFTs;  consider histopathologic correlation as indicated. Nonspecific  subcentimeter hypodensities in the liver are too small to  characterize.     Cholelithiasis.     Nonobstructing right upper pole renal calculus.    Pancreas revealed atrophic pancreas with mild diffuse dilatation of the pancreatic duct with no focal lesion.     Additional findings as discussed above.       Colonoscopy 10/5/2021 revealed hemorrhoids, perianal skin tags, single nonbleeding colonic angio ectasia, a 5 mm polyp in the ascending colon, 2 diminutive polyps in the distal transverse colon, a 5 mm polyp in the distal transverse colon.  Pathology showed tubular adenoma and sessile serrated adenoma.    EGD 10/5/2021 revealed normal esophagus, gastritis,  duodenitis.    FibroScan 8/16/2023 revealed a median liver stiffness of 8.1 kPa with IQR 11% and .        Assessment/Plan:     Cirrhosis, probably due to SERNA  FibroScan in 8/2023 showing stage 2 fibrosis and severe steatosis    The patient is referred to me for follow-up evaluation of cirrhosis.    The patient was noted by the surgeon on 7/8/2021 to have what appeared to be a cirrhotic liver during appendectomy. Unfortunately, they did not do a liver biopsy. For now, I will assume that the patient does have cirrhosis and treat her accordingly.    The most likely etiology would be SERNA cirrhosis.    We again spoke about fatty liver disease. I explained that the risk factors include diabetes, obesity, hyperlipidemia and alcohol use. I explained that she needs to work on diet, weight loss and exercise.  She also needs to work aggressively with the primary provider about hyperlipidemia and diabetes.  I did explain that 20-25% of patients with SERNA may proceed to cirrhosis.    I again reminded her that working on the same risk factors could decrease her future risk of heart disease and stroke.    Her other serologies were unrevealing.    She is now immune to hepatitis A and hepatitis B.    As a cirrhotic patient, she would be at risk for esophageal varices.  EGD in October 2021 did not reveal varices.  We had previously discussed the idea of an EGD in 2 to 3 years (October 2023 or October 2024).  However, given the fact that she is so well compensated, I opted to start with a FibroScan and decide on the need for EGD based on the liver stiffness.  In fact, her FibroScan in August 2023 did not show significant liver stiffness.      Her hemoglobin has been relatively stable.  She denied any overt evidence of bleeding.  She did have some small polyps.  She will be due for a repeat 5-year colonoscopy in October 2026.  We will do a simultaneous EGD in October 2026 given the fact that we would already be sedating her  for the colonoscopy anyway.    As a cirrhotic patient, she would be at risk for hepatocellular carcinoma and should get ultrasound and alpha-fetoprotein every 6 months.    Based on her labs and overall condition, she seems to be a very well compensated cirrhotic.  She can get labs every 3 months and see me back in about 9 months.    She had previously asked whether she should undergo prophylactic cholecystectomy because she does travel to remote [place] the [place].  I told her that there is no guidelines suggesting prophylactic cholecystectomy in a patient who is asymptomatic.  I also reminded her that she would be at increased risk to undergo surgery given her underlying liver disease.    Thank you for allowing me to participate in the care of this patient. Please feel free to contact me with any questions regarding their care.     Sincerely,     Tin Baltazar MD, FAASLD, FACG.   Medical Director, Hepatology  [facility]  [facility]  Professor of Medicine  Division of Gastroenterology and Liver Disease  [employer] [facility] [employer] [employer] [facility] [employer]  [address] [place] [id number]  Phone: [phone]  Fax: [phone].      This document was generated with a computerized dictation system.  Because of this, there could be errors in grammar and/or content.

## 2025-03-30 DIAGNOSIS — I10 HYPERTENSION, UNSPECIFIED TYPE: ICD-10-CM

## 2025-03-30 RX ORDER — PROPRANOLOL HYDROCHLORIDE 10 MG/1
20 TABLET ORAL 2 TIMES DAILY
Qty: 360 TABLET | Refills: 3 | Status: SHIPPED | OUTPATIENT
Start: 2025-03-30

## 2025-04-07 DIAGNOSIS — I10 HYPERTENSION, UNSPECIFIED TYPE: ICD-10-CM

## 2025-04-07 RX ORDER — BISOPROLOL FUMARATE AND HYDROCHLOROTHIAZIDE 10; 6.25 MG/1; MG/1
1 TABLET ORAL DAILY
Qty: 90 TABLET | Refills: 1 | Status: SHIPPED | OUTPATIENT
Start: 2025-04-07

## 2025-05-09 ENCOUNTER — APPOINTMENT (OUTPATIENT)
Facility: CLINIC | Age: 70
End: 2025-05-09
Payer: MEDICARE

## 2025-05-09 VITALS
HEIGHT: 64 IN | DIASTOLIC BLOOD PRESSURE: 76 MMHG | SYSTOLIC BLOOD PRESSURE: 120 MMHG | HEART RATE: 64 BPM | WEIGHT: 175.3 LBS | RESPIRATION RATE: 16 BRPM | BODY MASS INDEX: 29.93 KG/M2

## 2025-05-09 DIAGNOSIS — I10 PRIMARY HYPERTENSION: ICD-10-CM

## 2025-05-09 DIAGNOSIS — Z79.4 TYPE 2 DIABETES MELLITUS WITH STAGE 3A CHRONIC KIDNEY DISEASE, WITH LONG-TERM CURRENT USE OF INSULIN (MULTI): Primary | ICD-10-CM

## 2025-05-09 DIAGNOSIS — E78.5 HYPERLIPIDEMIA, UNSPECIFIED HYPERLIPIDEMIA TYPE: ICD-10-CM

## 2025-05-09 DIAGNOSIS — E11.22 TYPE 2 DIABETES MELLITUS WITH STAGE 3A CHRONIC KIDNEY DISEASE, WITH LONG-TERM CURRENT USE OF INSULIN (MULTI): Primary | ICD-10-CM

## 2025-05-09 DIAGNOSIS — N18.31 TYPE 2 DIABETES MELLITUS WITH STAGE 3A CHRONIC KIDNEY DISEASE, WITH LONG-TERM CURRENT USE OF INSULIN (MULTI): Primary | ICD-10-CM

## 2025-05-09 DIAGNOSIS — E06.3 HYPOTHYROIDISM DUE TO HASHIMOTO'S THYROIDITIS: ICD-10-CM

## 2025-05-09 PROCEDURE — 1160F RVW MEDS BY RX/DR IN RCRD: CPT | Performed by: INTERNAL MEDICINE

## 2025-05-09 PROCEDURE — 1036F TOBACCO NON-USER: CPT | Performed by: INTERNAL MEDICINE

## 2025-05-09 PROCEDURE — 1159F MED LIST DOCD IN RCRD: CPT | Performed by: INTERNAL MEDICINE

## 2025-05-09 PROCEDURE — G2211 COMPLEX E/M VISIT ADD ON: HCPCS | Performed by: INTERNAL MEDICINE

## 2025-05-09 PROCEDURE — 99214 OFFICE O/P EST MOD 30 MIN: CPT | Performed by: INTERNAL MEDICINE

## 2025-05-09 PROCEDURE — 3074F SYST BP LT 130 MM HG: CPT | Performed by: INTERNAL MEDICINE

## 2025-05-09 PROCEDURE — 3044F HG A1C LEVEL LT 7.0%: CPT | Performed by: INTERNAL MEDICINE

## 2025-05-09 PROCEDURE — 4010F ACE/ARB THERAPY RXD/TAKEN: CPT | Performed by: INTERNAL MEDICINE

## 2025-05-09 PROCEDURE — 3078F DIAST BP <80 MM HG: CPT | Performed by: INTERNAL MEDICINE

## 2025-05-09 PROCEDURE — 3008F BODY MASS INDEX DOCD: CPT | Performed by: INTERNAL MEDICINE

## 2025-05-09 ASSESSMENT — ENCOUNTER SYMPTOMS
PALPITATIONS: 0
NAUSEA: 0
FATIGUE: 0
CHILLS: 0
SHORTNESS OF BREATH: 0
DIARRHEA: 0
FEVER: 0
COUGH: 0
VOMITING: 0
HEADACHES: 0

## 2025-05-09 NOTE — PROGRESS NOTES
"Endocrinology: Follow up visit  Subjective   Patient ID: Barbara A Haase is a 69 y.o. female who presents for Diabetes (Type 2 ), Hypothyroidism, Hyperlipidemia, and Hypertension.    PCP: Jese Waite MD    HPI  Dm2  Mdi  Lantus 31 units  Metformin 2000 every day  Trulicity 1.5  Jardiance 10 mg    Since last visit added jardiance   Notes fairly frequent lows in am lately  Feeling fine, no side effects on jardiance  Leaving for prolonged trip tomorrow to [place]     Checks sugars 4x a day  Injects insulin 4x a day  Currently utilizing cgm to check sugars     Review of Systems   Constitutional:  Negative for chills, fatigue and fever.   Respiratory:  Negative for cough and shortness of breath.    Cardiovascular:  Negative for chest pain and palpitations.   Gastrointestinal:  Negative for diarrhea, nausea and vomiting.   Neurological:  Negative for headaches.       Problem List[1]     Home Meds:  Current Outpatient Medications   Medication Instructions    aspirin 81 mg, Daily    BD Sarah 2nd Gen Pen Needle 32 gauge x 5/32\" needle USE WITH INSULIN 4 TIMES DAILY    bisoproloL-hydrochlorothiazide (Ziac) 10-6.25 mg tablet 1 tablet, oral, Daily    empagliflozin (JARDIANCE) 10 mg, oral, Daily    flash glucose sensor kit (FreeStyle Lucero 2 Sensor) kit Change every 14 days/ ICD10: E11.9 ON INSULIN    furosemide (LASIX) 20 mg, oral, Daily    insulin aspart (NovoLOG) 100 unit/mL (3 mL) pen Use with meals up to 40 units a day    Lantus Solostar U-100 Insulin 37 Units, subcutaneous, Daily, Take as directed per insulin instructions.    levothyroxine (SYNTHROID, LEVOXYL) 112 mcg, oral, Daily    losartan (COZAAR) 50 mg, oral, Daily    metFORMIN (GLUCOPHAGE) 1,000 mg, oral, 2 times daily    propranolol (INDERAL) 20 mg, oral, 2 times daily    simvastatin (ZOCOR) 10 mg, oral, Daily    Trulicity 1.5 mg, subcutaneous, Once Weekly        RX Allergies[2]     Objective   Vitals:    05/09/25 1345   BP: 120/76   Pulse: 64   Resp: 16    " "  Vitals:    05/09/25 1345   Weight: 79.5 kg (175 lb 4.8 oz)      Body mass index is 30.09 kg/m².   Physical Exam  Constitutional:       Appearance: Normal appearance. She is overweight.   HENT:      Head: Normocephalic and atraumatic.   Neck:      Thyroid: No thyroid mass, thyromegaly or thyroid tenderness.   Cardiovascular:      Rate and Rhythm: Normal rate and regular rhythm.      Heart sounds: No murmur heard.     No gallop.   Pulmonary:      Effort: Pulmonary effort is normal.      Breath sounds: Normal breath sounds.   Abdominal:      Palpations: Abdomen is soft.      Comments: benign   Neurological:      General: No focal deficit present.      Mental Status: She is alert and oriented to person, place, and time.      Deep Tendon Reflexes: Reflexes are normal and symmetric.   Psychiatric:         Behavior: Behavior is cooperative.         Labs:  Lab Results   Component Value Date    HGBA1C 6.5 02/25/2025    TSH 2.50 02/05/2024      No results found for: \"PR1\", \"THYROIDPAB\", \"TSI\"     Assessment/Plan   Assessment & Plan  Type 2 diabetes mellitus with stage 3a chronic kidney disease, with long-term current use of insulin (Multi)  Sugars are great but much too low in am  Reduce lantus to 26 units  Message with further issues    Orders:    CBC; Future    Comprehensive Metabolic Panel; Future    Lipid Panel; Future    Hemoglobin A1C; Future    Albumin-Creatinine Ratio, Urine Random; Future     Hypothyroidism due to Hashimoto's thyroiditis  Recheck tsh due  Orders:    TSH with reflex to Free T4 if abnormal; Future    Hyperlipidemia, unspecified hyperlipidemia type    Lipids under excellent control: due for recheck        Primary hypertension    Bp excellent            Electronically signed by:  Jhoana Malik MD 05/09/25 2:07 PM                   [1]   Patient Active Problem List  Diagnosis    Abnormal mammogram    Astigmatism    Hyperopia    Myopia with presbyopia    Cirrhosis, nonalcoholic (Multi)    Diabetes " mellitus, type 2 (Multi)    History of YAG laser capsulotomy of lens    Hyperlipidemia    Hypertension    Hypothyroidism    Mild nonproliferative diabetic retinopathy of both eyes without macular edema    Primary osteoarthritis of right knee    Stage 3 chronic kidney disease (Multi)    Pseudophakia    Tremor    Tubular adenoma of colon    Stiffness of right knee, not elsewhere classified    Calcification of left breast    Routine general medical examination at health care facility    Type 2 diabetes mellitus without complications    Visit for screening mammogram    Nephrolithiasis    Calculus of gallbladder without cholecystitis without obstruction    Infective urethritis    Atrophic vaginitis    Hyperopia, bilateral    Presbyopia    Elevated coronary artery calcium score    Diabetes mellitus with hemoglobin A1c goal of 7.0%-8.0% (Multi)   [2]   Allergies  Allergen Reactions    Penicillins Unknown    Sulfamethoxazole Other

## 2025-05-09 NOTE — ASSESSMENT & PLAN NOTE
Sugars are great but much too low in am  Reduce lantus to 26 units  Message with further issues    Orders:    CBC; Future    Comprehensive Metabolic Panel; Future    Lipid Panel; Future    Hemoglobin A1C; Future    Albumin-Creatinine Ratio, Urine Random; Future

## 2025-05-23 DIAGNOSIS — K74.60 CIRRHOSIS, NONALCOHOLIC (MULTI): ICD-10-CM

## 2025-05-31 DIAGNOSIS — I10 PRIMARY HYPERTENSION: ICD-10-CM

## 2025-05-31 RX ORDER — FUROSEMIDE 20 MG/1
20 TABLET ORAL DAILY
Qty: 90 TABLET | Refills: 1 | Status: SHIPPED | OUTPATIENT
Start: 2025-05-31

## 2025-06-01 DIAGNOSIS — E78.5 HYPERLIPIDEMIA, UNSPECIFIED HYPERLIPIDEMIA TYPE: ICD-10-CM

## 2025-06-01 RX ORDER — SIMVASTATIN 10 MG/1
10 TABLET, FILM COATED ORAL DAILY
Qty: 90 TABLET | Refills: 1 | Status: SHIPPED | OUTPATIENT
Start: 2025-06-01

## 2025-06-02 ENCOUNTER — OFFICE VISIT (OUTPATIENT)
Dept: PRIMARY CARE | Facility: CLINIC | Age: 70
End: 2025-06-02
Payer: MEDICARE

## 2025-06-02 VITALS
BODY MASS INDEX: 29.88 KG/M2 | WEIGHT: 175 LBS | SYSTOLIC BLOOD PRESSURE: 113 MMHG | HEART RATE: 85 BPM | HEIGHT: 64 IN | DIASTOLIC BLOOD PRESSURE: 74 MMHG

## 2025-06-02 DIAGNOSIS — J01.00 ACUTE NON-RECURRENT MAXILLARY SINUSITIS: Primary | ICD-10-CM

## 2025-06-02 DIAGNOSIS — E11.9 TYPE 2 DIABETES MELLITUS WITHOUT COMPLICATION, WITHOUT LONG-TERM CURRENT USE OF INSULIN: ICD-10-CM

## 2025-06-02 DIAGNOSIS — I10 PRIMARY HYPERTENSION: ICD-10-CM

## 2025-06-02 PROCEDURE — 3008F BODY MASS INDEX DOCD: CPT | Performed by: FAMILY MEDICINE

## 2025-06-02 PROCEDURE — 4010F ACE/ARB THERAPY RXD/TAKEN: CPT | Performed by: FAMILY MEDICINE

## 2025-06-02 PROCEDURE — 3044F HG A1C LEVEL LT 7.0%: CPT | Performed by: FAMILY MEDICINE

## 2025-06-02 PROCEDURE — 3078F DIAST BP <80 MM HG: CPT | Performed by: FAMILY MEDICINE

## 2025-06-02 PROCEDURE — 99214 OFFICE O/P EST MOD 30 MIN: CPT | Performed by: FAMILY MEDICINE

## 2025-06-02 PROCEDURE — 3074F SYST BP LT 130 MM HG: CPT | Performed by: FAMILY MEDICINE

## 2025-06-02 PROCEDURE — G2211 COMPLEX E/M VISIT ADD ON: HCPCS | Performed by: FAMILY MEDICINE

## 2025-06-02 RX ORDER — CEFUROXIME AXETIL 500 MG/1
500 TABLET ORAL 2 TIMES DAILY
Qty: 20 TABLET | Refills: 0 | Status: SHIPPED | OUTPATIENT
Start: 2025-06-02 | End: 2025-06-12

## 2025-06-02 RX ORDER — METHYLPREDNISOLONE 4 MG/1
TABLET ORAL
Qty: 21 TABLET | Refills: 0 | Status: SHIPPED | OUTPATIENT
Start: 2025-06-02 | End: 2025-06-08

## 2025-06-02 ASSESSMENT — ENCOUNTER SYMPTOMS
LOSS OF SENSATION IN FEET: 0
DEPRESSION: 0
OCCASIONAL FEELINGS OF UNSTEADINESS: 0

## 2025-06-04 ENCOUNTER — HOSPITAL ENCOUNTER (OUTPATIENT)
Dept: RADIOLOGY | Facility: CLINIC | Age: 70
Discharge: HOME | End: 2025-06-04
Payer: MEDICARE

## 2025-06-04 DIAGNOSIS — K74.60 CIRRHOSIS, NONALCOHOLIC (MULTI): ICD-10-CM

## 2025-06-04 PROCEDURE — 76705 ECHO EXAM OF ABDOMEN: CPT | Performed by: STUDENT IN AN ORGANIZED HEALTH CARE EDUCATION/TRAINING PROGRAM

## 2025-06-04 PROCEDURE — 76705 ECHO EXAM OF ABDOMEN: CPT

## 2025-06-05 ENCOUNTER — TELEPHONE (OUTPATIENT)
Dept: GASTROENTEROLOGY | Facility: CLINIC | Age: 70
End: 2025-06-05
Payer: MEDICARE

## 2025-06-05 DIAGNOSIS — R93.2 ABNORMAL ULTRASOUND OF LIVER: ICD-10-CM

## 2025-06-05 DIAGNOSIS — K74.60 CIRRHOSIS, NONALCOHOLIC (MULTI): ICD-10-CM

## 2025-06-05 DIAGNOSIS — K86.89 DILATED PANCREATIC DUCT (HHS-HCC): ICD-10-CM

## 2025-06-05 LAB
ALBUMIN SERPL-MCNC: 4.3 G/DL (ref 3.6–5.1)
ALBUMIN SERPL-MCNC: 4.4 G/DL (ref 3.6–5.1)
ALBUMIN/CREAT UR: 45 MG/G CREAT
ALBUMIN/GLOB SERPL: 1.7 (CALC) (ref 1–2.5)
ALP SERPL-CCNC: 48 U/L (ref 37–153)
ALP SERPL-CCNC: 49 U/L (ref 37–153)
ALT SERPL-CCNC: 14 U/L (ref 6–29)
ALT SERPL-CCNC: 14 U/L (ref 6–29)
ANION GAP SERPL CALCULATED.4IONS-SCNC: 13 MMOL/L (CALC) (ref 7–17)
ANION GAP SERPL CALCULATED.4IONS-SCNC: 15 MMOL/L (CALC) (ref 7–17)
AST SERPL-CCNC: 14 U/L (ref 10–35)
AST SERPL-CCNC: 14 U/L (ref 10–35)
BASOPHILS # BLD AUTO: 11 CELLS/UL (ref 0–200)
BASOPHILS NFR BLD AUTO: 0.1 %
BILIRUB DIRECT SERPL-MCNC: 0.1 MG/DL
BILIRUB INDIRECT SERPL-MCNC: 0.4 MG/DL (CALC) (ref 0.2–1.2)
BILIRUB SERPL-MCNC: 0.5 MG/DL (ref 0.2–1.2)
BILIRUB SERPL-MCNC: 0.5 MG/DL (ref 0.2–1.2)
BUN SERPL-MCNC: 34 MG/DL (ref 7–25)
BUN SERPL-MCNC: 34 MG/DL (ref 7–25)
BUN/CREAT SERPL: 34 (CALC) (ref 6–22)
CALCIUM SERPL-MCNC: 9.5 MG/DL (ref 8.6–10.4)
CALCIUM SERPL-MCNC: 9.7 MG/DL (ref 8.6–10.4)
CHLORIDE SERPL-SCNC: 98 MMOL/L (ref 98–110)
CHLORIDE SERPL-SCNC: 99 MMOL/L (ref 98–110)
CHOLEST SERPL-MCNC: 160 MG/DL
CHOLEST/HDLC SERPL: 2.8 (CALC)
CO2 SERPL-SCNC: 24 MMOL/L (ref 20–32)
CO2 SERPL-SCNC: 26 MMOL/L (ref 20–32)
CREAT SERPL-MCNC: 0.97 MG/DL (ref 0.5–1.05)
CREAT SERPL-MCNC: 1.01 MG/DL (ref 0.5–1.05)
CREAT UR-MCNC: 51 MG/DL (ref 20–275)
EGFRCR SERPLBLD CKD-EPI 2021: 60 ML/MIN/1.73M2
EGFRCR SERPLBLD CKD-EPI 2021: 63 ML/MIN/1.73M2
EOSINOPHIL # BLD AUTO: 0 CELLS/UL (ref 15–500)
EOSINOPHIL NFR BLD AUTO: 0 %
ERYTHROCYTE [DISTWIDTH] IN BLOOD BY AUTOMATED COUNT: 14.4 % (ref 11–15)
ERYTHROCYTE [DISTWIDTH] IN BLOOD BY AUTOMATED COUNT: 14.9 % (ref 11–15)
EST. AVERAGE GLUCOSE BLD GHB EST-MCNC: 146 MG/DL
EST. AVERAGE GLUCOSE BLD GHB EST-SCNC: 8.1 MMOL/L
GLOBULIN SER CALC-MCNC: 2.6 G/DL (CALC) (ref 1.9–3.7)
GLUCOSE SERPL-MCNC: 221 MG/DL (ref 65–99)
GLUCOSE SERPL-MCNC: 225 MG/DL (ref 65–99)
HBA1C MFR BLD: 6.7 %
HCT VFR BLD AUTO: 40.8 % (ref 35–45)
HCT VFR BLD AUTO: 42.2 % (ref 35–45)
HDLC SERPL-MCNC: 57 MG/DL
HGB BLD-MCNC: 13.2 G/DL (ref 11.7–15.5)
HGB BLD-MCNC: 13.3 G/DL (ref 11.7–15.5)
INR PPP: 1
LDLC SERPL CALC-MCNC: 81 MG/DL (CALC)
LYMPHOCYTES # BLD AUTO: 757 CELLS/UL (ref 850–3900)
LYMPHOCYTES NFR BLD AUTO: 6.7 %
MCH RBC QN AUTO: 30.2 PG (ref 27–33)
MCH RBC QN AUTO: 30.5 PG (ref 27–33)
MCHC RBC AUTO-ENTMCNC: 31.5 G/DL (ref 32–36)
MCHC RBC AUTO-ENTMCNC: 32.4 G/DL (ref 32–36)
MCV RBC AUTO: 94.2 FL (ref 80–100)
MCV RBC AUTO: 95.9 FL (ref 80–100)
MICROALBUMIN UR-MCNC: 2.3 MG/DL
MONOCYTES # BLD AUTO: 305 CELLS/UL (ref 200–950)
MONOCYTES NFR BLD AUTO: 2.7 %
NEUTROPHILS # BLD AUTO: ABNORMAL CELLS/UL (ref 1500–7800)
NEUTROPHILS NFR BLD AUTO: 90.5 %
NONHDLC SERPL-MCNC: 103 MG/DL (CALC)
PLATELET # BLD AUTO: 317 THOUSAND/UL (ref 140–400)
PLATELET # BLD AUTO: 342 THOUSAND/UL (ref 140–400)
PMV BLD REES-ECKER: 10.5 FL (ref 7.5–12.5)
PMV BLD REES-ECKER: 11 FL (ref 7.5–12.5)
POTASSIUM SERPL-SCNC: 4.3 MMOL/L (ref 3.5–5.3)
POTASSIUM SERPL-SCNC: 4.5 MMOL/L (ref 3.5–5.3)
PROT SERPL-MCNC: 6.9 G/DL (ref 6.1–8.1)
PROT SERPL-MCNC: 6.9 G/DL (ref 6.1–8.1)
PROTHROMBIN TIME: 11.1 SEC (ref 9–11.5)
RBC # BLD AUTO: 4.33 MILLION/UL (ref 3.8–5.1)
RBC # BLD AUTO: 4.4 MILLION/UL (ref 3.8–5.1)
SODIUM SERPL-SCNC: 137 MMOL/L (ref 135–146)
SODIUM SERPL-SCNC: 138 MMOL/L (ref 135–146)
TRIGL SERPL-MCNC: 121 MG/DL
TSH SERPL-ACNC: 0.52 MIU/L (ref 0.4–4.5)
WBC # BLD AUTO: 11.3 THOUSAND/UL (ref 3.8–10.8)
WBC # BLD AUTO: 11.6 THOUSAND/UL (ref 3.8–10.8)

## 2025-06-05 NOTE — TELEPHONE ENCOUNTER
Called patient to discuss the following results and plan per Dr Baltazar:    ----- Message from Tin Baltazar sent at 6/5/2025  6:14 AM EDT -----  We will recommend an MRI with MRCP as per the radiologist.  ----- Message -----  From: Interface, Radiology Results In  Sent: 6/5/2025   5:58 AM EDT  To: Tin Baltazar MD

## 2025-06-08 PROBLEM — J01.00 ACUTE NON-RECURRENT MAXILLARY SINUSITIS: Status: ACTIVE | Noted: 2025-06-08

## 2025-06-08 PROBLEM — E11.9 DIABETES MELLITUS WITH HEMOGLOBIN A1C GOAL OF 7.0%-8.0% (MULTI): Status: RESOLVED | Noted: 2025-03-02 | Resolved: 2025-06-08

## 2025-06-08 PROBLEM — E11.9 DIABETES MELLITUS, TYPE 2 (MULTI): Status: RESOLVED | Noted: 2023-04-19 | Resolved: 2025-06-08

## 2025-06-08 NOTE — PROGRESS NOTES
"Assessment and Plan:  Problem List Items Addressed This Visit       Hypertension    Current Assessment & Plan   Avoid Sudafed continue medications blood pressure is controlled         Type 2 diabetes mellitus without complications    Current Assessment & Plan   A1c is well-controlled continue medication         Acute non-recurrent maxillary sinusitis - Primary    Relevant Medications    methylPREDNISolone (Medrol Dospak) 4 mg tablets    cefuroxime (Ceftin) 500 mg tablet         HPI:  Patient is here for acute visit presents with cough congestion postnasal drainage has had a lot of fullness in the ears using over-the-counter products ongoing for 10 days nothing seems to be helping is planning to fly soon blood pressure is controlled diabetes is also well-controlled      ROS   Constitutional:  o weight loss. Negative for chills and fever.   HENT: As per HPI.     Respiratory: Negative.     Cardiovascular: Negative.    Gastrointestinal: Negative.  Negative for nausea and vomiting.   Endocrine: Negative.    Genitourinary: Negative.    Musculoskeletal: Negative.    Skin: Negative.  Negative for rash.   Allergic/Immunologic: Negative.    Neurological: Negative.    Hematological: Negative.    Psychiatric/Behavioral: Negative.     All other systems reviewed and are negative.      /74   Pulse 85   Ht 1.626 m (5' 4\")   Wt 79.4 kg (175 lb)   BMI 30.04 kg/m²   Body mass index is 30.04 kg/m².  Physical Exam    ENT:      Head: Normocephalic and atraumatic.  TMs are dull bilateral effusions nasal congestion erythema postnasal drainage pharyngeal erythema present     Eyes:      Pupils: Pupils are equal, round, and reactive to light.   Cardiovascular:      Rate and Rhythm: Normal rate and regular rhythm.      Pulses: Normal pulses.      Heart sounds: Normal heart sounds.   Pulmonary:      Effort: Pulmonary effort is normal.      Breath sounds: Normal breath sounds.   Abdominal:      General: Abdomen is flat. Bowel sounds " "are normal.      Palpations: Abdomen is soft.   Musculoskeletal:         General: Normal range of motion.      Cervical back: Normal range of motion and neck supple.   Skin:     General: Skin is warm and dry.      Capillary Refill: Capillary refill takes less than 2 seconds.   Neurological:      General: No focal deficit present.      Mental Status: She is alert and oriented to person, place, and time.   Psychiatric:         Mood and Affect: Mood normal.       Active Problem List  Problem List[1]    Comprehensive Medical/Surgical/Social/Family History  Medical History[2]  Surgical History[3]  Social History     Social History Narrative    Not on file       Allergies and Medications  Penicillins and Sulfamethoxazole  Current Outpatient Medications   Medication Instructions    aspirin 81 mg, Daily    BD Sarah 2nd Gen Pen Needle 32 gauge x 5/32\" needle USE WITH INSULIN 4 TIMES DAILY    bisoproloL-hydrochlorothiazide (Ziac) 10-6.25 mg tablet 1 tablet, oral, Daily    cefuroxime (CEFTIN) 500 mg, oral, 2 times daily    empagliflozin (JARDIANCE) 10 mg, oral, Daily    flash glucose sensor kit (FreeStyle Lucero 2 Sensor) kit Change every 14 days/ ICD10: E11.9 ON INSULIN    furosemide (LASIX) 20 mg, oral, Daily    insulin aspart (NovoLOG) 100 unit/mL (3 mL) pen Use with meals up to 40 units a day    Lantus Solostar U-100 Insulin 37 Units, subcutaneous, Daily, Take as directed per insulin instructions.    levothyroxine (SYNTHROID, LEVOXYL) 112 mcg, oral, Daily    losartan (COZAAR) 50 mg, oral, Daily    metFORMIN (GLUCOPHAGE) 1,000 mg, oral, 2 times daily    methylPREDNISolone (Medrol Dospak) 4 mg tablets Take as directed on package.    propranolol (INDERAL) 20 mg, oral, 2 times daily    simvastatin (ZOCOR) 10 mg, oral, Daily    Trulicity 1.5 mg, subcutaneous, Once Weekly          [1]   Patient Active Problem List  Diagnosis    Abnormal mammogram    Astigmatism    Hyperopia    Myopia with presbyopia    Cirrhosis, nonalcoholic " (Multi)    History of YAG laser capsulotomy of lens    Hyperlipidemia    Hypertension    Hypothyroidism    Mild nonproliferative diabetic retinopathy of both eyes without macular edema    Primary osteoarthritis of right knee    Stage 3 chronic kidney disease (Multi)    Pseudophakia    Tremor    Tubular adenoma of colon    Stiffness of right knee, not elsewhere classified    Calcification of left breast    Routine general medical examination at health care facility    Type 2 diabetes mellitus without complications    Visit for screening mammogram    Nephrolithiasis    Calculus of gallbladder without cholecystitis without obstruction    Infective urethritis    Atrophic vaginitis    Hyperopia, bilateral    Presbyopia    Elevated coronary artery calcium score    Acute non-recurrent maxillary sinusitis   [2]   Past Medical History:  Diagnosis Date    Acquired absence of both cervix and uterus     H/O: hysterectomy    Acute cystitis with hematuria 04/19/2023    Arthritis     Cataract     COVID-19 virus infection 04/19/2023    Diabetes mellitus with kidney disease (Multi) 04/19/2023    Disease of thyroid gland     Hypertension     Personal history of other diseases of the digestive system     History of appendicitis    Personal history of other endocrine, nutritional and metabolic disease     History of diabetic retinopathy    Postprocedural hypothyroidism     H/O thyroidectomy    Right lower quadrant pain 07/08/2021    Acute right lower quadrant pain    Type 2 diabetes mellitus without complications     Diabetes mellitus, stable    Varicella     Visual impairment    [3]   Past Surgical History:  Procedure Laterality Date    APPENDECTOMY  2022    BREAST BIOPSY      HYSTERECTOMY      JOINT REPLACEMENT      OTHER SURGICAL HISTORY  07/16/2021    Hysterectomy    OTHER SURGICAL HISTORY  07/16/2021    Appendectomy    OTHER SURGICAL HISTORY  07/16/2021    Thyroidectomy    OTHER SURGICAL HISTORY  04/25/2022    YAG laser  capsulotomy    OTHER SURGICAL HISTORY  04/25/2022    Cataract surgery    OTHER SURGICAL HISTORY  01/06/2022    Knee replacement    OTHER SURGICAL HISTORY  01/06/2022    Tonsillectomy    TONSILLECTOMY      WISDOM TOOTH EXTRACTION

## 2025-06-25 ENCOUNTER — HOSPITAL ENCOUNTER (OUTPATIENT)
Dept: RADIOLOGY | Facility: HOSPITAL | Age: 70
Discharge: HOME | End: 2025-06-25
Payer: MEDICARE

## 2025-06-25 DIAGNOSIS — K74.60 CIRRHOSIS, NONALCOHOLIC (MULTI): ICD-10-CM

## 2025-06-25 DIAGNOSIS — K86.89 DILATED PANCREATIC DUCT (HHS-HCC): ICD-10-CM

## 2025-06-25 DIAGNOSIS — R93.2 ABNORMAL ULTRASOUND OF LIVER: ICD-10-CM

## 2025-06-25 PROCEDURE — 74183 MRI ABD W/O CNTR FLWD CNTR: CPT

## 2025-06-25 PROCEDURE — 2550000001 HC RX 255 CONTRASTS: Mod: JW | Performed by: INTERNAL MEDICINE

## 2025-06-25 PROCEDURE — A9575 INJ GADOTERATE MEGLUMI 0.1ML: HCPCS | Mod: JW | Performed by: INTERNAL MEDICINE

## 2025-06-25 RX ORDER — GADOTERATE MEGLUMINE 376.9 MG/ML
15 INJECTION INTRAVENOUS
Status: COMPLETED | OUTPATIENT
Start: 2025-06-25 | End: 2025-06-25

## 2025-06-25 RX ADMIN — GADOTERATE MEGLUMINE 15 ML: 376.9 INJECTION INTRAVENOUS at 17:36

## 2025-06-29 ENCOUNTER — PATIENT MESSAGE (OUTPATIENT)
Dept: GASTROENTEROLOGY | Facility: CLINIC | Age: 70
End: 2025-06-29
Payer: MEDICARE

## 2025-06-30 DIAGNOSIS — K74.60 CIRRHOSIS, NONALCOHOLIC (MULTI): ICD-10-CM

## 2025-06-30 DIAGNOSIS — K86.89 DILATED PANCREATIC DUCT (HHS-HCC): ICD-10-CM

## 2025-06-30 DIAGNOSIS — E78.5 HYPERLIPIDEMIA, UNSPECIFIED HYPERLIPIDEMIA TYPE: Primary | ICD-10-CM

## 2025-06-30 DIAGNOSIS — R93.2 ABNORMAL ULTRASOUND OF LIVER: ICD-10-CM

## 2025-06-30 RX ORDER — ATORVASTATIN CALCIUM 20 MG/1
20 TABLET, FILM COATED ORAL DAILY
Qty: 90 TABLET | Refills: 3 | Status: SHIPPED | OUTPATIENT
Start: 2025-06-30 | End: 2026-06-30

## 2025-07-04 DIAGNOSIS — N18.31 TYPE 2 DIABETES MELLITUS WITH STAGE 3A CHRONIC KIDNEY DISEASE, WITH LONG-TERM CURRENT USE OF INSULIN (MULTI): ICD-10-CM

## 2025-07-04 DIAGNOSIS — E11.22 TYPE 2 DIABETES MELLITUS WITH STAGE 3A CHRONIC KIDNEY DISEASE, WITH LONG-TERM CURRENT USE OF INSULIN (MULTI): ICD-10-CM

## 2025-07-04 DIAGNOSIS — Z79.4 TYPE 2 DIABETES MELLITUS WITH STAGE 3A CHRONIC KIDNEY DISEASE, WITH LONG-TERM CURRENT USE OF INSULIN (MULTI): ICD-10-CM

## 2025-07-04 RX ORDER — INSULIN GLARGINE 100 [IU]/ML
INJECTION, SOLUTION SUBCUTANEOUS
Qty: 45 ML | Refills: 3 | Status: SHIPPED | OUTPATIENT
Start: 2025-07-04

## 2025-07-13 ASSESSMENT — CUP TO DISC RATIO
OD_RATIO: .2
OS_RATIO: .25

## 2025-07-13 ASSESSMENT — SLIT LAMP EXAM - LIDS
COMMENTS: GOOD POSITION
COMMENTS: GOOD POSITION

## 2025-07-13 ASSESSMENT — EXTERNAL EXAM - LEFT EYE: OS_EXAM: NORMAL

## 2025-07-13 ASSESSMENT — EXTERNAL EXAM - RIGHT EYE: OD_EXAM: NORMAL

## 2025-07-13 NOTE — PROGRESS NOTES
Diabetes [id number]  Mild nonproliferative diabetic retinopathy of both eyes without macular [id number]  -OCT macula (7/16/25) - SS: 9/10 OD and 9/10 OS. Normal thickness and contour OU. Intact IS-OS OU. Possible tiny pocket of edema OD seen previously, no longer seen, no edema OS. Few exudates OD?. Small break in EZ IT macula OS. 248/266. Stable from 3/11/24.   -HbA1c= 6.7 (6/4/25).   -Mild nonproliferative diabetic retinopathy seen on exam - stable. Will need to monitor for macular edema.   -Per patient, history of laser OU for DM - possibly focal laser? No PRP seen on exam.   -Continue close monitoring of blood glucose, blood pressure, and cholesterol.   -F/u 6 months - for comprehensive exam and OCT macula.    Pseudophakia of both eyesZ96.1  History of YAG laser capsulotomy of lensZ98.49  -Stable. Good vision. Monitor.     TjipbfvaeZ64.00  [id number]  LajgxsygmuR90.4  -Only has NVO, does not wear glasses for driving - uncorrected binocular distance vision 20/20.  -Rx given 1/15/25 - obtained DVO for driving/theater, but patient feels it does not improve vision significantly, sees just as well without glasses.       No FH of AMD/glaucoma

## 2025-07-14 ENCOUNTER — APPOINTMENT (OUTPATIENT)
Dept: HEMATOLOGY/ONCOLOGY | Facility: HOSPITAL | Age: 70
End: 2025-07-14
Payer: MEDICARE

## 2025-07-14 NOTE — TUMOR BOARD NOTE
HEPATOLOGY ATTENDING UPDATE NOTE    The patient was discussed at the liver radiology tumor board today.    The impression was that the pancreas looks somewhat atrophic with dilated PD. There is no mass. The liver lesion is only 6 mm and wedge shaped.    The recommendation was to get repeat MRI and AFP in about 6 months.    JANESSA Baltazar.

## 2025-07-15 ENCOUNTER — TELEPHONE (OUTPATIENT)
Dept: GASTROENTEROLOGY | Facility: CLINIC | Age: 70
End: 2025-07-15
Payer: MEDICARE

## 2025-07-15 DIAGNOSIS — K86.89 DILATED PANCREATIC DUCT (HHS-HCC): ICD-10-CM

## 2025-07-15 DIAGNOSIS — R93.2 ABNORMAL ULTRASOUND OF LIVER: ICD-10-CM

## 2025-07-15 DIAGNOSIS — K74.60 CIRRHOSIS, NONALCOHOLIC (MULTI): ICD-10-CM

## 2025-07-15 NOTE — TELEPHONE ENCOUNTER
Called patient to discuss the following results and plan per Dr Baltazar:  HEPATOLOGY ATTENDING UPDATE NOTE     The patient was discussed at the liver radiology tumor board today.     The impression was that the pancreas looks somewhat atrophic with dilated PD. There is no mass. The liver lesion is only 6 mm and wedge shaped.     The recommendation was to get repeat MRI and AFP in about 6 months.     JANESSA Baltazar.

## 2025-07-16 ENCOUNTER — APPOINTMENT (OUTPATIENT)
Dept: OPHTHALMOLOGY | Age: 70
End: 2025-07-16
Payer: MEDICARE

## 2025-07-16 DIAGNOSIS — N18.31 TYPE 2 DIABETES MELLITUS WITH STAGE 3A CHRONIC KIDNEY DISEASE, WITH LONG-TERM CURRENT USE OF INSULIN (MULTI): ICD-10-CM

## 2025-07-16 DIAGNOSIS — Z79.4 TYPE 2 DIABETES MELLITUS WITH STAGE 3A CHRONIC KIDNEY DISEASE, WITH LONG-TERM CURRENT USE OF INSULIN (MULTI): ICD-10-CM

## 2025-07-16 DIAGNOSIS — E11.22 TYPE 2 DIABETES MELLITUS WITH STAGE 3A CHRONIC KIDNEY DISEASE, WITH LONG-TERM CURRENT USE OF INSULIN (MULTI): ICD-10-CM

## 2025-07-16 DIAGNOSIS — H52.4 PRESBYOPIA: ICD-10-CM

## 2025-07-16 DIAGNOSIS — E11.3293 MILD NONPROLIFERATIVE DIABETIC RETINOPATHY OF BOTH EYES WITHOUT MACULAR EDEMA ASSOCIATED WITH TYPE 2 DIABETES MELLITUS: Primary | ICD-10-CM

## 2025-07-16 DIAGNOSIS — Z96.1 PSEUDOPHAKIA: ICD-10-CM

## 2025-07-16 DIAGNOSIS — H52.203 ASTIGMATISM OF BOTH EYES, UNSPECIFIED TYPE: ICD-10-CM

## 2025-07-16 DIAGNOSIS — H52.03 HYPEROPIA, BILATERAL: ICD-10-CM

## 2025-07-16 PROCEDURE — 92134 CPTRZ OPH DX IMG PST SGM RTA: CPT | Mod: BILATERAL PROCEDURE | Performed by: OPHTHALMOLOGY

## 2025-07-16 PROCEDURE — 99213 OFFICE O/P EST LOW 20 MIN: CPT | Performed by: OPHTHALMOLOGY

## 2025-07-16 RX ORDER — INSULIN ASPART 100 [IU]/ML
INJECTION, SOLUTION INTRAVENOUS; SUBCUTANEOUS
Qty: 40 ML | Refills: 3 | Status: CANCELLED | OUTPATIENT
Start: 2025-07-16

## 2025-07-16 RX ORDER — INSULIN ASPART 100 [IU]/ML
INJECTION, SOLUTION INTRAVENOUS; SUBCUTANEOUS
Qty: 45 ML | Refills: 3 | Status: SHIPPED | OUTPATIENT
Start: 2025-07-16

## 2025-07-16 RX ORDER — INSULIN ASPART 100 [IU]/ML
INJECTION, SOLUTION INTRAVENOUS; SUBCUTANEOUS
Qty: 40 ML | Refills: 3 | Status: SHIPPED | OUTPATIENT
Start: 2025-07-16

## 2025-07-16 RX ORDER — CICLOPIROX OLAMINE 7.7 MG/G
CREAM TOPICAL
COMMUNITY
Start: 2025-07-15

## 2025-07-16 ASSESSMENT — VISUAL ACUITY
OS_SC: 20/20
METHOD: SNELLEN - LINEAR
OD_SC: 20/20

## 2025-07-16 ASSESSMENT — ENCOUNTER SYMPTOMS
ALLERGIC/IMMUNOLOGIC NEGATIVE: 0
RESPIRATORY NEGATIVE: 0
CARDIOVASCULAR NEGATIVE: 0
EYES NEGATIVE: 1
GASTROINTESTINAL NEGATIVE: 0
NEUROLOGICAL NEGATIVE: 0
CONSTITUTIONAL NEGATIVE: 0
MUSCULOSKELETAL NEGATIVE: 0
HEMATOLOGIC/LYMPHATIC NEGATIVE: 0
ENDOCRINE NEGATIVE: 0
PSYCHIATRIC NEGATIVE: 0

## 2025-07-16 ASSESSMENT — REFRACTION_WEARINGRX
OD_SPHERE: +3.25
OD_CYLINDER: -1.25
OS_CYLINDER: -1.25
OS_AXIS: 115
SPECS_TYPE: READING
OS_SPHERE: +3.75
OD_AXIS: 090

## 2025-07-16 ASSESSMENT — TONOMETRY
OS_IOP_MMHG: 12
IOP_METHOD: GOLDMANN APPLANATION
OD_IOP_MMHG: 14

## 2025-08-02 DIAGNOSIS — E06.3 HYPOTHYROIDISM DUE TO HASHIMOTO'S THYROIDITIS: ICD-10-CM

## 2025-08-02 DIAGNOSIS — I10 HYPERTENSION, UNSPECIFIED TYPE: ICD-10-CM

## 2025-08-02 RX ORDER — LOSARTAN POTASSIUM 50 MG/1
50 TABLET ORAL DAILY
Qty: 90 TABLET | Refills: 3 | Status: SHIPPED | OUTPATIENT
Start: 2025-08-02

## 2025-08-02 RX ORDER — LEVOTHYROXINE SODIUM 112 UG/1
112 TABLET ORAL DAILY
Qty: 90 TABLET | Refills: 3 | Status: SHIPPED | OUTPATIENT
Start: 2025-08-02

## 2025-08-06 DIAGNOSIS — E78.5 HYPERLIPIDEMIA, UNSPECIFIED HYPERLIPIDEMIA TYPE: Primary | ICD-10-CM

## 2025-08-06 RX ORDER — ATORVASTATIN CALCIUM 20 MG/1
20 TABLET, FILM COATED ORAL DAILY
Qty: 90 TABLET | Refills: 3 | Status: SHIPPED | OUTPATIENT
Start: 2025-08-06 | End: 2026-08-06

## 2025-08-12 ENCOUNTER — OFFICE VISIT (OUTPATIENT)
Dept: CARDIOLOGY | Facility: HOSPITAL | Age: 70
End: 2025-08-12
Payer: MEDICARE

## 2025-08-12 VITALS
HEIGHT: 64 IN | DIASTOLIC BLOOD PRESSURE: 75 MMHG | RESPIRATION RATE: 18 BRPM | WEIGHT: 175 LBS | BODY MASS INDEX: 29.88 KG/M2 | SYSTOLIC BLOOD PRESSURE: 115 MMHG | HEART RATE: 74 BPM

## 2025-08-12 DIAGNOSIS — I10 PRIMARY HYPERTENSION: Primary | ICD-10-CM

## 2025-08-12 LAB
ATRIAL RATE: 74 BPM
P AXIS: 55 DEGREES
P OFFSET: 188 MS
P ONSET: 129 MS
PR INTERVAL: 180 MS
Q ONSET: 219 MS
QRS COUNT: 12 BEATS
QRS DURATION: 82 MS
QT INTERVAL: 406 MS
QTC CALCULATION(BAZETT): 450 MS
QTC FREDERICIA: 435 MS
R AXIS: -10 DEGREES
T AXIS: 58 DEGREES
T OFFSET: 422 MS
VENTRICULAR RATE: 74 BPM

## 2025-08-12 PROCEDURE — 1159F MED LIST DOCD IN RCRD: CPT | Performed by: INTERNAL MEDICINE

## 2025-08-12 PROCEDURE — 1160F RVW MEDS BY RX/DR IN RCRD: CPT | Performed by: INTERNAL MEDICINE

## 2025-08-12 PROCEDURE — 3044F HG A1C LEVEL LT 7.0%: CPT | Performed by: INTERNAL MEDICINE

## 2025-08-12 PROCEDURE — 3074F SYST BP LT 130 MM HG: CPT | Performed by: INTERNAL MEDICINE

## 2025-08-12 PROCEDURE — 99212 OFFICE O/P EST SF 10 MIN: CPT

## 2025-08-12 PROCEDURE — 99214 OFFICE O/P EST MOD 30 MIN: CPT | Performed by: INTERNAL MEDICINE

## 2025-08-12 PROCEDURE — 3078F DIAST BP <80 MM HG: CPT | Performed by: INTERNAL MEDICINE

## 2025-08-12 PROCEDURE — 93005 ELECTROCARDIOGRAM TRACING: CPT | Performed by: INTERNAL MEDICINE

## 2025-08-12 PROCEDURE — 3008F BODY MASS INDEX DOCD: CPT | Performed by: INTERNAL MEDICINE

## 2025-08-12 PROCEDURE — 4010F ACE/ARB THERAPY RXD/TAKEN: CPT | Performed by: INTERNAL MEDICINE

## 2025-08-12 PROCEDURE — 1036F TOBACCO NON-USER: CPT | Performed by: INTERNAL MEDICINE

## 2025-08-13 ENCOUNTER — OFFICE VISIT (OUTPATIENT)
Dept: OPHTHALMOLOGY | Age: 70
End: 2025-08-13
Payer: MEDICARE

## 2025-08-13 DIAGNOSIS — E11.3293 MILD NONPROLIFERATIVE DIABETIC RETINOPATHY OF BOTH EYES WITHOUT MACULAR EDEMA ASSOCIATED WITH TYPE 2 DIABETES MELLITUS: ICD-10-CM

## 2025-08-13 DIAGNOSIS — H43.811 PVD (POSTERIOR VITREOUS DETACHMENT), RIGHT EYE: Primary | ICD-10-CM

## 2025-08-13 DIAGNOSIS — Z96.1 PSEUDOPHAKIA: ICD-10-CM

## 2025-08-13 DIAGNOSIS — H52.203 ASTIGMATISM OF BOTH EYES, UNSPECIFIED TYPE: ICD-10-CM

## 2025-08-13 DIAGNOSIS — H52.4 PRESBYOPIA: ICD-10-CM

## 2025-08-13 DIAGNOSIS — H52.03 HYPEROPIA, BILATERAL: ICD-10-CM

## 2025-08-13 PROCEDURE — 99213 OFFICE O/P EST LOW 20 MIN: CPT | Performed by: OPHTHALMOLOGY

## 2025-08-13 ASSESSMENT — ENCOUNTER SYMPTOMS
CONSTITUTIONAL NEGATIVE: 0
RESPIRATORY NEGATIVE: 0
CARDIOVASCULAR NEGATIVE: 0
PSYCHIATRIC NEGATIVE: 0
ALLERGIC/IMMUNOLOGIC NEGATIVE: 0
GASTROINTESTINAL NEGATIVE: 0
EYES NEGATIVE: 1
ENDOCRINE NEGATIVE: 0
NEUROLOGICAL NEGATIVE: 0
HEMATOLOGIC/LYMPHATIC NEGATIVE: 0
MUSCULOSKELETAL NEGATIVE: 0

## 2025-08-13 ASSESSMENT — SLIT LAMP EXAM - LIDS
COMMENTS: GOOD POSITION
COMMENTS: GOOD POSITION

## 2025-08-13 ASSESSMENT — VISUAL ACUITY
OD_SC: 20/20
OS_SC: 20/20
METHOD: SNELLEN - LINEAR

## 2025-08-13 ASSESSMENT — TONOMETRY
OS_IOP_MMHG: 15
OD_IOP_MMHG: 16
IOP_METHOD: GOLDMANN APPLANATION

## 2025-08-13 ASSESSMENT — EXTERNAL EXAM - RIGHT EYE: OD_EXAM: NORMAL

## 2025-08-13 ASSESSMENT — CUP TO DISC RATIO
OS_RATIO: .25
OD_RATIO: .2

## 2025-08-13 ASSESSMENT — EXTERNAL EXAM - LEFT EYE: OS_EXAM: NORMAL

## 2025-08-15 DIAGNOSIS — K74.60 CIRRHOSIS, NONALCOHOLIC (MULTI): ICD-10-CM

## 2025-08-18 ENCOUNTER — TELEPHONE (OUTPATIENT)
Dept: OPHTHALMOLOGY | Facility: CLINIC | Age: 70
End: 2025-08-18
Payer: MEDICARE

## 2025-08-20 ENCOUNTER — OFFICE VISIT (OUTPATIENT)
Dept: OPHTHALMOLOGY | Age: 70
End: 2025-08-20
Payer: MEDICARE

## 2025-08-20 DIAGNOSIS — H43.811 PVD (POSTERIOR VITREOUS DETACHMENT), RIGHT EYE: Primary | ICD-10-CM

## 2025-08-20 DIAGNOSIS — H52.4 PRESBYOPIA: ICD-10-CM

## 2025-08-20 DIAGNOSIS — H52.203 ASTIGMATISM OF BOTH EYES, UNSPECIFIED TYPE: ICD-10-CM

## 2025-08-20 DIAGNOSIS — Z96.1 PSEUDOPHAKIA: ICD-10-CM

## 2025-08-20 DIAGNOSIS — E11.3293 MILD NONPROLIFERATIVE DIABETIC RETINOPATHY OF BOTH EYES WITHOUT MACULAR EDEMA ASSOCIATED WITH TYPE 2 DIABETES MELLITUS: ICD-10-CM

## 2025-08-20 DIAGNOSIS — H52.03 HYPEROPIA, BILATERAL: ICD-10-CM

## 2025-08-20 PROCEDURE — 92134 CPTRZ OPH DX IMG PST SGM RTA: CPT | Performed by: OPHTHALMOLOGY

## 2025-08-20 PROCEDURE — 99213 OFFICE O/P EST LOW 20 MIN: CPT | Performed by: OPHTHALMOLOGY

## 2025-08-20 ASSESSMENT — CUP TO DISC RATIO
OD_RATIO: .2
OS_RATIO: .25

## 2025-08-20 ASSESSMENT — VISUAL ACUITY
OS_SC: 20/20
OS_SC+: -1
METHOD: SNELLEN - LINEAR
OD_SC: 20/20

## 2025-08-20 ASSESSMENT — REFRACTION_WEARINGRX
OS_SPHERE: +3.75
OS_CYLINDER: -1.25
OS_AXIS: 115
OD_SPHERE: +3.25
OD_AXIS: 090
OD_CYLINDER: -1.25
SPECS_TYPE: READING

## 2025-08-20 ASSESSMENT — TONOMETRY
OD_IOP_MMHG: 15
OS_IOP_MMHG: 15
IOP_METHOD: GOLDMANN APPLANATION

## 2025-08-20 ASSESSMENT — EXTERNAL EXAM - RIGHT EYE: OD_EXAM: NORMAL

## 2025-08-20 ASSESSMENT — EXTERNAL EXAM - LEFT EYE: OS_EXAM: NORMAL

## 2025-08-20 ASSESSMENT — SLIT LAMP EXAM - LIDS
COMMENTS: GOOD POSITION
COMMENTS: GOOD POSITION

## 2025-08-21 ENCOUNTER — APPOINTMENT (OUTPATIENT)
Dept: PRIMARY CARE | Facility: CLINIC | Age: 70
End: 2025-08-21
Payer: MEDICARE

## 2025-08-21 VITALS
HEART RATE: 68 BPM | WEIGHT: 178.8 LBS | SYSTOLIC BLOOD PRESSURE: 118 MMHG | BODY MASS INDEX: 30.52 KG/M2 | HEIGHT: 64 IN | OXYGEN SATURATION: 94 % | DIASTOLIC BLOOD PRESSURE: 77 MMHG

## 2025-08-21 DIAGNOSIS — Z78.0 MENOPAUSE: ICD-10-CM

## 2025-08-21 DIAGNOSIS — R93.1 ELEVATED CORONARY ARTERY CALCIUM SCORE: ICD-10-CM

## 2025-08-21 DIAGNOSIS — E11.9 TYPE 2 DIABETES MELLITUS WITHOUT COMPLICATION, WITHOUT LONG-TERM CURRENT USE OF INSULIN: ICD-10-CM

## 2025-08-21 DIAGNOSIS — Z12.31 VISIT FOR SCREENING MAMMOGRAM: ICD-10-CM

## 2025-08-21 DIAGNOSIS — E78.00 PURE HYPERCHOLESTEROLEMIA: ICD-10-CM

## 2025-08-21 DIAGNOSIS — Z00.00 ROUTINE GENERAL MEDICAL EXAMINATION AT HEALTH CARE FACILITY: ICD-10-CM

## 2025-08-21 DIAGNOSIS — I10 PRIMARY HYPERTENSION: ICD-10-CM

## 2025-08-21 DIAGNOSIS — E06.3 HYPOTHYROIDISM DUE TO HASHIMOTO THYROIDITIS: ICD-10-CM

## 2025-08-21 DIAGNOSIS — Z00.00 ROUTINE GENERAL MEDICAL EXAMINATION AT A HEALTH CARE FACILITY: Primary | ICD-10-CM

## 2025-08-21 PROCEDURE — 1159F MED LIST DOCD IN RCRD: CPT | Performed by: FAMILY MEDICINE

## 2025-08-21 PROCEDURE — 3074F SYST BP LT 130 MM HG: CPT | Performed by: FAMILY MEDICINE

## 2025-08-21 PROCEDURE — 1170F FXNL STATUS ASSESSED: CPT | Performed by: FAMILY MEDICINE

## 2025-08-21 PROCEDURE — 3078F DIAST BP <80 MM HG: CPT | Performed by: FAMILY MEDICINE

## 2025-08-21 PROCEDURE — G0439 PPPS, SUBSEQ VISIT: HCPCS | Performed by: FAMILY MEDICINE

## 2025-08-21 PROCEDURE — 3044F HG A1C LEVEL LT 7.0%: CPT | Performed by: FAMILY MEDICINE

## 2025-08-21 PROCEDURE — 99214 OFFICE O/P EST MOD 30 MIN: CPT | Performed by: FAMILY MEDICINE

## 2025-08-21 PROCEDURE — 4010F ACE/ARB THERAPY RXD/TAKEN: CPT | Performed by: FAMILY MEDICINE

## 2025-08-21 PROCEDURE — 3008F BODY MASS INDEX DOCD: CPT | Performed by: FAMILY MEDICINE

## 2025-08-21 ASSESSMENT — ACTIVITIES OF DAILY LIVING (ADL)
DRESSING: INDEPENDENT
GROCERY_SHOPPING: INDEPENDENT
DOING_HOUSEWORK: INDEPENDENT
TAKING_MEDICATION: INDEPENDENT
BATHING: INDEPENDENT
MANAGING_FINANCES: INDEPENDENT

## 2025-08-21 ASSESSMENT — PATIENT HEALTH QUESTIONNAIRE - PHQ9
1. LITTLE INTEREST OR PLEASURE IN DOING THINGS: NOT AT ALL
2. FEELING DOWN, DEPRESSED OR HOPELESS: NOT AT ALL
2. FEELING DOWN, DEPRESSED OR HOPELESS: NOT AT ALL
1. LITTLE INTEREST OR PLEASURE IN DOING THINGS: NOT AT ALL
SUM OF ALL RESPONSES TO PHQ9 QUESTIONS 1 AND 2: 0
SUM OF ALL RESPONSES TO PHQ9 QUESTIONS 1 AND 2: 0

## 2025-08-21 ASSESSMENT — ENCOUNTER SYMPTOMS
LOSS OF SENSATION IN FEET: 0
OCCASIONAL FEELINGS OF UNSTEADINESS: 0
DEPRESSION: 0

## 2025-08-21 ASSESSMENT — COLUMBIA-SUICIDE SEVERITY RATING SCALE - C-SSRS
1. IN THE PAST MONTH, HAVE YOU WISHED YOU WERE DEAD OR WISHED YOU COULD GO TO SLEEP AND NOT WAKE UP?: NO
2. HAVE YOU ACTUALLY HAD ANY THOUGHTS OF KILLING YOURSELF?: NO
6. HAVE YOU EVER DONE ANYTHING, STARTED TO DO ANYTHING, OR PREPARED TO DO ANYTHING TO END YOUR LIFE?: NO

## 2025-08-24 PROBLEM — Z78.0 MENOPAUSE: Status: ACTIVE | Noted: 2025-08-24

## 2025-08-24 PROBLEM — Z00.00 ROUTINE GENERAL MEDICAL EXAMINATION AT A HEALTH CARE FACILITY: Status: ACTIVE | Noted: 2025-08-24

## 2025-08-24 ASSESSMENT — ENCOUNTER SYMPTOMS
GASTROINTESTINAL NEGATIVE: 1
VOMITING: 0
ALLERGIC/IMMUNOLOGIC NEGATIVE: 1
ENDOCRINE NEGATIVE: 1
RESPIRATORY NEGATIVE: 1
CARDIOVASCULAR NEGATIVE: 1
CHILLS: 0
NAUSEA: 0
HEMATOLOGIC/LYMPHATIC NEGATIVE: 1
FEVER: 0
NEUROLOGICAL NEGATIVE: 1
MUSCULOSKELETAL NEGATIVE: 1
PSYCHIATRIC NEGATIVE: 1

## 2025-08-25 ENCOUNTER — APPOINTMENT (OUTPATIENT)
Dept: PRIMARY CARE | Facility: CLINIC | Age: 70
End: 2025-08-25
Payer: MEDICARE

## 2025-09-08 ENCOUNTER — APPOINTMENT (OUTPATIENT)
Dept: OPHTHALMOLOGY | Age: 70
End: 2025-09-08
Payer: MEDICARE

## 2025-09-12 ENCOUNTER — APPOINTMENT (OUTPATIENT)
Facility: CLINIC | Age: 70
End: 2025-09-12
Payer: MEDICARE

## 2025-11-12 ENCOUNTER — APPOINTMENT (OUTPATIENT)
Dept: OPHTHALMOLOGY | Age: 70
End: 2025-11-12
Payer: MEDICARE

## 2025-12-09 ENCOUNTER — APPOINTMENT (OUTPATIENT)
Dept: GASTROENTEROLOGY | Facility: CLINIC | Age: 70
End: 2025-12-09
Payer: MEDICARE

## 2026-01-16 ENCOUNTER — APPOINTMENT (OUTPATIENT)
Facility: CLINIC | Age: 71
End: 2026-01-16
Payer: MEDICARE

## 2026-01-21 ENCOUNTER — APPOINTMENT (OUTPATIENT)
Dept: OPHTHALMOLOGY | Age: 71
End: 2026-01-21
Payer: MEDICARE

## 2026-02-23 ENCOUNTER — APPOINTMENT (OUTPATIENT)
Dept: PRIMARY CARE | Facility: CLINIC | Age: 71
End: 2026-02-23
Payer: MEDICARE

## 2026-05-15 ENCOUNTER — APPOINTMENT (OUTPATIENT)
Facility: CLINIC | Age: 71
End: 2026-05-15
Payer: MEDICARE